# Patient Record
Sex: FEMALE | Race: WHITE | NOT HISPANIC OR LATINO | Employment: FULL TIME | URBAN - METROPOLITAN AREA
[De-identification: names, ages, dates, MRNs, and addresses within clinical notes are randomized per-mention and may not be internally consistent; named-entity substitution may affect disease eponyms.]

---

## 2020-12-10 ENCOUNTER — COMMUNICATION - HEALTHEAST (OUTPATIENT)
Dept: SCHEDULING | Facility: CLINIC | Age: 35
End: 2020-12-10

## 2021-05-20 ENCOUNTER — COMMUNICATION - HEALTHEAST (OUTPATIENT)
Dept: SCHEDULING | Facility: CLINIC | Age: 36
End: 2021-05-20

## 2021-05-30 ENCOUNTER — RECORDS - HEALTHEAST (OUTPATIENT)
Dept: ADMINISTRATIVE | Facility: CLINIC | Age: 36
End: 2021-05-30

## 2021-05-31 ENCOUNTER — RECORDS - HEALTHEAST (OUTPATIENT)
Dept: ADMINISTRATIVE | Facility: CLINIC | Age: 36
End: 2021-05-31

## 2021-06-02 ENCOUNTER — RECORDS - HEALTHEAST (OUTPATIENT)
Dept: ADMINISTRATIVE | Facility: CLINIC | Age: 36
End: 2021-06-02

## 2021-06-13 NOTE — TELEPHONE ENCOUNTER
She was given a cortisone shot by the podiatrist and broke out in hives on her arms and legs. She normally takes Zyrtec so will continue to do that. I connected her with scheduling for an appointment and advised urgent care if she's unable to get an appointment after she's done with work today.  Melanie Lozada RN  [facility]      Reason for Disposition    [1] MODERATE-SEVERE hives persist (i.e., hives interfere with normal activities or work) AND [2] taking antihistamine (e.g., Benadryl, Claritin) > 24 hours    Additional Information    Negative: [1] Life-threatening reaction (anaphylaxis) in the past to similar substance (e.g., food, insect bite/sting, chemical, etc.) AND [2] < 2 hours since exposure    Negative: Difficulty breathing or wheezing now    Negative: [1] Swollen tongue AND [2] rapid onset    Negative: [1] Hoarseness or cough now AND [2] rapid onset    Negative: Shock suspected (e.g., cold/pale/clammy skin, too weak to stand, low BP, rapid pulse)    Negative: Difficult to awaken or acting confused (e.g., disoriented, slurred speech)    Negative: Sounds like a life-threatening emergency to the triager    Negative: [1] Bee, wasp, or yellow jacket sting AND [2] within last 24 hours    Negative: Blood-colored, dark red or purple rash    Negative: [1] Drug rash suspected AND [2] started taking new medicine within last 2 weeks(Exception: antihistamine, eye drops, ear drops, decongestant or other OTC cough/cold medicines)    Negative: Doesn't match the SYMPTOMS of hives    Negative: Swollen tongue    Negative: [1] Widespread hives AND [2] onset < 2 hours of exposure to high-risk allergen (e.g., peanuts, tree nuts, fish or shellfish)    Negative: Patient sounds very sick or weak to the triager    Protocols used: HIVES-A-

## 2021-06-16 PROBLEM — F10.10 ALCOHOL ABUSE: Status: ACTIVE | Noted: 2018-08-31

## 2021-06-16 PROBLEM — M54.50 ACUTE MIDLINE LOW BACK PAIN WITHOUT SCIATICA: Status: ACTIVE | Noted: 2019-02-10

## 2021-06-16 PROBLEM — F33.1 MODERATE EPISODE OF RECURRENT MAJOR DEPRESSIVE DISORDER (H): Status: ACTIVE | Noted: 2019-03-04

## 2021-06-17 NOTE — TELEPHONE ENCOUNTER
[facility] patient    At about 1030-11 took her anti-depressant    Got stuck in her throat   Has tried multiple things to get it to go down     Heart rate is up high and back is tight    A little bit of difficulty breathing    Top of body is very heavy    Hurts to breath in  -starts in back     Pain is severe    Triaged to a disposition of Call 911 EMS. Patient is agreeable.     COVID 19 Nurse Triage Plan/Patient Instructions    Please be aware that novel coronavirus (COVID-19) may be circulating in the community. If you develop symptoms such as fever, cough, or SOB or if you have concerns about the presence of another infection including coronavirus (COVID-19), please contact your health care provider or visit  [email or web address].    Disposition/Instructions    Call to EMS/911 recommended. Follow protocol based instructions.    Bring Your Own Device:  Please also bring your smart device(s) (smart phones, tablets, laptops) and their charging cables for your personal use and to communicate with your care team during your visit.      Thank you for taking steps to prevent the spread of this virus.  o Limit your contact with others.  o Wear a simple mask to cover your cough.  o Wash your hands well and often.    Resources    [facility]: About COVID-19: [email or web address]    CDC: What to Do If You're Sick: www.cdc.gov/coronavirus/2019-ncov/about/steps-when-sick.html    CDC: Ending Home Isolation: www.cdc.gov/coronavirus/2019-ncov/hcp/disposition-in-home-patients.html     CDC: Caring for Someone: www.cdc.gov/coronavirus/2019-ncov/if-you-are-sick/care-for-someone.html     [facility]: Interim Guidance for Hospital Discharge to Home: www.health.state.[place]/diseases/coronavirus/hcp/hospdischarge.pdf    [facility] clinical trials (COVID-19 research studies): [email or web address]     Below are the COVID-19 hotlines at the [place] Department of Health ([facility]).  Interpreters are available.   o For health questions: Call 038-944-4729 or 1-797.480.8065 (7 a.m. to 7 p.m.)  o For questions about schools and childcare: Call 735-165-4416 or 1-709.898.1247 (7 a.m. to 7 p.m.)     Reason for Disposition    SEVERE symptoms of pill stuck in throat or esophagus (e.g., severe pain, bleeding, or inability to swallow liquids)    Any difficulty breathing (e.g., coughing, wheezing or stridor)    Additional Information    Negative: Sounds like a life-threatening emergency to the triager    Negative: Symptoms of blocked esophagus (e.g., can't swallow normal secretions, drooling)    Negative: Symptoms of FB stuck in throat or esophagus (e.g., continued pain in throat or chest, FB sensation, blood-tinged saliva) (Exception: pill stuck)    Negative: Can't swallow water or bread    Negative: Sharp object  (e.g., needle, nail, safety pin, toothpick, bone, bottle cap, pull tab, dental bridge work)     (Exception: tiny chips of glass generally pass without any symptoms)    Negative: Button battery (or other battery) known or suspected    Negative: Magnet    Negative: Packet of drugs (e.g., condom filled with cocaine)    Negative: Swallowed 2 or more FBs (e.g., multiple objects)    Negative: Swallowed a poisonous object (e.g., a lead sinker or a bullet)    Negative: Swallowed a (non-food) FB on purpose    Negative: Choked on or inhaled food or foreign body (but did not swallow it)    Protocols used: SWALLOWED FOREIGN BODY-A-BAUDILIO Lozano RN Triage Nurse Advisor

## 2021-08-25 ENCOUNTER — HOSPITAL ENCOUNTER (EMERGENCY)
Facility: HOSPITAL | Age: 36
Discharge: HOME | End: 2021-08-25
Attending: EMERGENCY MEDICINE | Admitting: EMERGENCY MEDICINE
Payer: COMMERCIAL

## 2021-08-25 VITALS
WEIGHT: 198 LBS | RESPIRATION RATE: 16 BRPM | HEART RATE: 93 BPM | TEMPERATURE: 97.8 F | OXYGEN SATURATION: 100 % | SYSTOLIC BLOOD PRESSURE: 124 MMHG | BODY MASS INDEX: 36.21 KG/M2 | DIASTOLIC BLOOD PRESSURE: 79 MMHG

## 2021-08-25 DIAGNOSIS — M54.50 ACUTE BILATERAL LOW BACK PAIN WITHOUT SCIATICA: ICD-10-CM

## 2021-08-25 PROCEDURE — 250N000013 HC RX MED GY IP 250 OP 250 PS 637: Performed by: EMERGENCY MEDICINE

## 2021-08-25 PROCEDURE — 99283 EMERGENCY DEPT VISIT LOW MDM: CPT

## 2021-08-25 RX ORDER — METHOCARBAMOL 750 MG/1
750-1500 TABLET, FILM COATED ORAL 4 TIMES DAILY PRN
Qty: 30 TABLET | Refills: 0 | Status: SHIPPED | OUTPATIENT
Start: 2021-08-25

## 2021-08-25 RX ORDER — HYDROCODONE BITARTRATE AND ACETAMINOPHEN 5; 325 MG/1; MG/1
1-2 TABLET ORAL EVERY 4 HOURS PRN
Qty: 18 TABLET | Refills: 0 | Status: SHIPPED | OUTPATIENT
Start: 2021-08-25 | End: 2021-08-28

## 2021-08-25 RX ORDER — METHOCARBAMOL 500 MG/1
1500 TABLET, FILM COATED ORAL ONCE
Status: COMPLETED | OUTPATIENT
Start: 2021-08-25 | End: 2021-08-25

## 2021-08-25 RX ORDER — HYDROCODONE BITARTRATE AND ACETAMINOPHEN 5; 325 MG/1; MG/1
2 TABLET ORAL ONCE
Status: COMPLETED | OUTPATIENT
Start: 2021-08-25 | End: 2021-08-25

## 2021-08-25 RX ADMIN — HYDROCODONE BITARTRATE AND ACETAMINOPHEN 2 TABLET: 5; 325 TABLET ORAL at 23:44

## 2021-08-25 RX ADMIN — METHOCARBAMOL 1500 MG: 500 TABLET, FILM COATED ORAL at 23:43

## 2021-08-25 NOTE — ED TRIAGE NOTES
Pt has been having mid to low back pain for 3 days without known cause; taking ibuprofen without relief.

## 2021-08-25 NOTE — Clinical Note
Katelynn Cronin was seen and treated in our emergency department on 8/25/2021.  She may return to work on 08/30/2021.  No work until August 30.  May return sooner if feeling well     If you have any questions or concerns, please don't hesitate to call.      Yessy Woo MD

## 2021-08-26 NOTE — DISCHARGE INSTRUCTIONS
Ice to your back for 15 minutes 1 PM for 1 to 2 hours  IbuProfen 600 mg every 6 hours as needed for pain  followup with your primary care doctor within the next week for recheck  Return to the emergency department for worsening problems or concerns

## 2021-08-26 NOTE — ED PROVIDER NOTES
EMERGENCY DEPARTMENT ENCOUnter      NAME: Katelynn Cronin  AGE: 36 year old female  YOB: 1985  MRN: [id number]  EVALUATION DATE & TIME: 8/25/2021 10:15 PM    PCP: Ayden Grace    ED PROVIDER: Yessy Woo MD      Chief Complaint   Patient presents with     Back Pain         FINAL IMPRESSION:  1. Acute bilateral low back pain without sciatica          ED COURSE & MEDICAL DECISION MAKING:    In summary, the patient is a 36-year-old female that presents to the emergency department for low back pain thought secondary to a musculoskeletal etiology.  She has no concerning symptoms for her back pain.  She has no urinary symptoms concerning for urinary tract infection.  2315 Introduced myself to patient, gathered patient history, and performed an initial exam. Patient reports that she will not be driving herself tonight.  Vicodin 2 tablets p.o. was administered for pain.  Robaxin 1500 mg p.o. was administered for muscle relaxation.  2320 I discussed the plan for discharge with the patient, and patient is agreeable. We discussed supportive cares at home and reasons for return to the ER including new or worsening symptoms - all questions and concerns addressed. Patient to be discharged by RN.          At the conclusion of the encounter I discussed the results of all of the tests and the disposition. The questions were answered. The patient or family acknowledged understanding and was agreeable with the care plan.         MEDICATIONS GIVEN IN THE EMERGENCY:  Medications   HYDROcodone-acetaminophen (NORCO) 5-325 MG per tablet 2 tablet (2 tablets Oral Given 8/25/21 2344)   methocarbamol (ROBAXIN) tablet 1,500 mg (1,500 mg Oral Given 8/25/21 2343)       NEW PRESCRIPTIONS STARTED AT TODAY'S ER VISIT  Discharge Medication List as of 8/25/2021 11:45 PM      START taking these medications    Details   HYDROcodone-acetaminophen (NORCO) 5-325 MG tablet Take 1-2 tablets by mouth every 4 hours as needed for  pain, Disp-18 tablet, R-0, Local Print      methocarbamol (ROBAXIN) 750 MG tablet Take 1-2 tablets (750-1,500 mg) by mouth 4 times daily as needed for muscle spasms, Disp-30 tablet, R-0, Local Print                =================================================================    HPI        Katelynn Cronin is a 36 year old female with a pertinent history of MACARIO III, PPH, hypercholesteremia, depression, and alcohol abuse who presents to this ED by walk in for evaluation of back pain. Patient presents with constant lower back pain that began ~3 days ago that suddenly worsened when she took a shower this evening. Currently, back pain is sharp and radiates upwards. Pain is 10/10 in intensity and worsens with walking. Denies any palliating factors. She has tried ibuprofen without relief for pain. Denies injuries or trauma to the area, medical history, or allergies to medications. Patient takes daily medications for her anxiety and depression. Patient denies urinary symptoms, fever, chills, nausea, vomiting, diarrhea, cough, or any additional complaints at this time.     SHx: Patient is a non-smoker and occasional drinker. Patient works at [employer].       REVIEW OF SYSTEMS     Constitutional:  Denies fever or chills  HENT:  Denies sore throat   Respiratory:  Denies cough or shortness of breath   Cardiovascular:  Denies chest pain or palpitations  GI:  Denies abdominal pain, nausea, or vomiting  Musculoskeletal:  Denies any new extremity pain. Endorses back pain.   Skin:  Denies rash   Neurologic:  Denies headache, focal weakness or sensory changes    All other systems reviewed and are negative      PAST MEDICAL HISTORY:  Past Medical History:   Diagnosis Date     Acute midline low back pain without sciatica 2/10/2019    Formatting of this note might be different from the original. Slipped and fell directly onto her back on 2/4/2019. Was seen at [facility] ER on 2/4/2019 and lumbar spine x-rays showed an age  indeterminate 20% compression of L2 (but patient was not found to have tenderness to palpation over L2 on clinical exam in the ER), and lumbar spine, pelvis and left hip x-rays were otherwise unremarkable     Alcohol abuse 8/31/2018    Formatting of this note might be different from the original. [facility] ER visit on 6/29/2018 for panic attack with alcohol intoxication (blood alcohol level 0.125).  Hospitalization at [facility] 8/13-8/20/2018 for alcohol intoxication (MILTON 0.16) and acetaminophen overdose. Patient was voluntarily admitted to the inpatient psychiatric unit for observation and treatment following medi     Allergic rhinitis 5/7/2014    Formatting of this note might be different from the original. Her symptoms are worst during the Spring (?) Skin Prick Testing was performed on: 6/13/2014  Sensitive to: English Plantain & Molds     Amblyopia 7/8/2010    Formatting of this note might be different from the original. Right eye.     Anxiety 2/29/2012    Formatting of this note might be different from the original. Zoloft started 2/29/12.  Switched to Effexor XR 1/15/2014.     MACARIO III (cervical intraepithelial neoplasia grade III) with severe dysplasia 10/29/2013    Formatting of this note might be different from the original. 09/04/2012 [facility]USHPV+ 10/02/2012 [facility] MACARIO I 09/10/2013 [facility]/ASCUS/HPV+ 10/10/2013 [facility] MACARIO II 10/29/2013 [facility] MACARIO II-III, Negative margins 01/29/2015 NIL/HPV negative 03/06/2015 NIL/HPV negative 05/17/2016 NIL/HPV negative 02/28/2019 NIL/HPV negative  ASCCP recommends:  History of CIN2 - CIN3:  Pap and HPV every 3 years for 20 years.   Pl     Dysthymic disorder 1/25/2013     Eczema 2/19/2015     Hypercholesterolemia 2/19/2015    Formatting of this note might be different from the original. Mildly elevated total and LDL cholesterol levels, along with a low HDL level, noted 2/19/2015.     Lateral epicondylitis of right elbow 8/28/2014     Moderate episode of recurrent major  depressive disorder (H) 3/4/2019    Formatting of this note might be different from the original. Zoloft started 2/29/12. Previously was on medication from 7254-9799, and briefly again in 2009 for postpartum depression. Effexor XR started on 1/15/2014. She is also following with Psychology for counseling therapy.     Overweight 6/29/2009     PPH (postpartum hemorrhage) 3/13/2009     Vitamin D deficiency 6/13/2014    Formatting of this note might be different from the original. Treatment started 6/13/2014 with Vitamin D 5,000 units daily per Allergy specialist.     Vocal cord dysfunction 6/15/2014    Formatting of this note might be different from the original. Diagnosed by Allergy specialist 6/13/2014, and treatment with Speech Therapy offered if the patient wishes to pursue this.       PAST SURGICAL HISTORY:  History reviewed. No pertinent surgical history.        CURRENT MEDICATIONS:    HYDROcodone-acetaminophen (NORCO) 5-325 MG tablet  methocarbamol (ROBAXIN) 750 MG tablet  cetirizine (ZYRTEC) 10 MG tablet  gabapentin (NEURONTIN) 100 MG capsule  naproxen (NAPROSYN) 500 MG tablet  QUEtiapine (SEROQUEL) 50 MG tablet  selenium sulfide 2.25 % Sham  venlafaxine (EFFEXOR-XR) 150 MG 24 hr capsule        ALLERGIES:  Allergies   Allergen Reactions     Grass Extracts [Gramineae Pollens] Rash and Swelling     Swelling, itching, and no voice   Swelling, itching, and no voice        Pollen Extracts [Pollen Extract] Rash     Swelling, itching, and no voice        FAMILY HISTORY:  History reviewed. No pertinent family history.    SOCIAL HISTORY:   Social History     Socioeconomic History     Marital status: Single     Spouse name: None     Number of children: None     Years of education: None     Highest education level: None   Occupational History     None   Tobacco Use     Smoking status: None   Substance and Sexual Activity     Alcohol use: None     Drug use: None     Sexual activity: None   Other Topics Concern     None    Social History Narrative     None     Social Determinants of Health     Financial Resource Strain:      Difficulty of Paying Living Expenses:    Food Insecurity:      Worried About Running Out of Food in the Last Year:      Ran Out of Food in the Last Year:    Transportation Needs:      Lack of Transportation (Medical):      Lack of Transportation (Non-Medical):    Physical Activity:      Days of Exercise per Week:      Minutes of Exercise per Session:    Stress:      Feeling of Stress :    Social Connections:      Frequency of Communication with Friends and Family:      Frequency of Social Gatherings with Friends and Family:      Attends Congregation Services:      Active Member of Clubs or Organizations:      Attends Club or Organization Meetings:      Marital Status:    Intimate Partner Violence:      Fear of Current or Ex-Partner:      Emotionally Abused:      Physically Abused:      Sexually Abused:        VITALS:  Patient Vitals for the past 24 hrs:   BP Temp Temp src Pulse Resp SpO2 Weight   08/25/21 1835 -- 97.8  F (36.6  C) Oral -- -- -- --   08/25/21 1833 124/79 -- -- 93 16 100 % 89.8 kg (198 lb)       PHYSICAL EXAM    Constitutional:  Well developed, Well nourished,  HENT:  Normocephalic, Atraumatic, Bilateral external ears normal, Oropharynx moist, Nose normal.   Neck:  Normal range of motion, No meningismus, No stridor.   Eyes:  EOMI, Conjunctiva normal, No discharge.   Respiratory:  Normal breath sounds, No respiratory distress, No wheezing, No chest tenderness.   Cardiovascular:  Normal heart rate, Normal rhythm, No murmurs  GI:  Soft, No tenderness, No guarding, No CVA tenderness.   Musculoskeletal:  Neurovascularly intact distally, No edema,  Tenderness left lumbar paraspinal region, No cyanosis, Good range of motion in all major joints.  tenderness to palpation left lumbar paraspinal region; spasm appreciated left lumbar paraspinal region  Integument:  Warm, Dry, No erythema, No rash.   Lymphatic:   No lymphadenopathy noted.   Neurologic:  Alert & oriented x 3, Normal motor function, Normal sensory function, No focal deficits noted.   Psychiatric:  Affect normal, Judgment normal, Mood normal.          I, Frankie Waddell, am serving as a scribe to document services personally performed by Dr. Woo based on my observation and the provider's statements to me. I, Yessy Woo MD attest that Frankie Waddell is acting in a scribe capacity, has observed my performance of the services and has documented them in accordance with my direction.    Yessy Woo MD  Emergency Medicine  [facility] EMERGENCY DEPARTMENT  [facility] [facility] [address] [phone]  Dept: [phone]     Yessy Woo MD  08/27/21 8590

## 2021-09-30 ENCOUNTER — HOSPITAL ENCOUNTER (EMERGENCY)
Facility: HOSPITAL | Age: 36
Discharge: HOME | End: 2021-10-01
Attending: STUDENT IN AN ORGANIZED HEALTH CARE EDUCATION/TRAINING PROGRAM | Admitting: STUDENT IN AN ORGANIZED HEALTH CARE EDUCATION/TRAINING PROGRAM
Payer: COMMERCIAL

## 2021-09-30 DIAGNOSIS — R53.83 OTHER FATIGUE: ICD-10-CM

## 2021-09-30 LAB
ANION GAP SERPL CALCULATED.3IONS-SCNC: 9 MMOL/L (ref 5–18)
BASOPHILS # BLD AUTO: 0 10E3/UL (ref 0–0.2)
BASOPHILS NFR BLD AUTO: 1 %
BUN SERPL-MCNC: 13 MG/DL (ref 8–22)
CALCIUM SERPL-MCNC: 8.9 MG/DL (ref 8.5–10.5)
CHLORIDE BLD-SCNC: 107 MMOL/L (ref 98–107)
CO2 SERPL-SCNC: 27 MMOL/L (ref 22–31)
CREAT SERPL-MCNC: 0.88 MG/DL (ref 0.6–1.1)
EOSINOPHIL # BLD AUTO: 0.2 10E3/UL (ref 0–0.7)
EOSINOPHIL NFR BLD AUTO: 3 %
ERYTHROCYTE [DISTWIDTH] IN BLOOD BY AUTOMATED COUNT: 12.8 % (ref 10–15)
GFR SERPL CREATININE-BSD FRML MDRD: 84 ML/MIN/1.73M2
GLUCOSE BLD-MCNC: 97 MG/DL (ref 70–125)
HCT VFR BLD AUTO: 43.1 % (ref 35–47)
HGB BLD-MCNC: 14.3 G/DL (ref 11.7–15.7)
IMM GRANULOCYTES # BLD: 0 10E3/UL
IMM GRANULOCYTES NFR BLD: 0 %
LYMPHOCYTES # BLD AUTO: 2.5 10E3/UL (ref 0.8–5.3)
LYMPHOCYTES NFR BLD AUTO: 32 %
MCH RBC QN AUTO: 31.5 PG (ref 26.5–33)
MCHC RBC AUTO-ENTMCNC: 33.2 G/DL (ref 31.5–36.5)
MCV RBC AUTO: 95 FL (ref 78–100)
MONOCYTES # BLD AUTO: 0.6 10E3/UL (ref 0–1.3)
MONOCYTES NFR BLD AUTO: 8 %
NEUTROPHILS # BLD AUTO: 4.6 10E3/UL (ref 1.6–8.3)
NEUTROPHILS NFR BLD AUTO: 56 %
NRBC # BLD AUTO: 0 10E3/UL
NRBC BLD AUTO-RTO: 0 /100
PLATELET # BLD AUTO: 372 10E3/UL (ref 150–450)
POTASSIUM BLD-SCNC: 3.7 MMOL/L (ref 3.5–5)
RBC # BLD AUTO: 4.54 10E6/UL (ref 3.8–5.2)
SODIUM SERPL-SCNC: 143 MMOL/L (ref 136–145)
WBC # BLD AUTO: 7.9 10E3/UL (ref 4–11)

## 2021-09-30 PROCEDURE — 81025 URINE PREGNANCY TEST: CPT | Performed by: STUDENT IN AN ORGANIZED HEALTH CARE EDUCATION/TRAINING PROGRAM

## 2021-09-30 PROCEDURE — 36415 COLL VENOUS BLD VENIPUNCTURE: CPT | Performed by: STUDENT IN AN ORGANIZED HEALTH CARE EDUCATION/TRAINING PROGRAM

## 2021-09-30 PROCEDURE — 99284 EMERGENCY DEPT VISIT MOD MDM: CPT

## 2021-09-30 PROCEDURE — 80048 BASIC METABOLIC PNL TOTAL CA: CPT | Performed by: STUDENT IN AN ORGANIZED HEALTH CARE EDUCATION/TRAINING PROGRAM

## 2021-09-30 PROCEDURE — 93005 ELECTROCARDIOGRAM TRACING: CPT | Performed by: STUDENT IN AN ORGANIZED HEALTH CARE EDUCATION/TRAINING PROGRAM

## 2021-09-30 PROCEDURE — 85025 COMPLETE CBC W/AUTO DIFF WBC: CPT | Performed by: STUDENT IN AN ORGANIZED HEALTH CARE EDUCATION/TRAINING PROGRAM

## 2021-09-30 ASSESSMENT — ENCOUNTER SYMPTOMS
DIZZINESS: 1
HEADACHES: 1
FATIGUE: 1
ANAL BLEEDING: 0
SORE THROAT: 1
FEVER: 0
SHORTNESS OF BREATH: 1

## 2021-10-01 VITALS
HEART RATE: 69 BPM | SYSTOLIC BLOOD PRESSURE: 110 MMHG | WEIGHT: 201 LBS | RESPIRATION RATE: 15 BRPM | DIASTOLIC BLOOD PRESSURE: 65 MMHG | OXYGEN SATURATION: 98 % | TEMPERATURE: 98.2 F | HEIGHT: 62 IN | BODY MASS INDEX: 36.99 KG/M2

## 2021-10-01 LAB
ALBUMIN UR-MCNC: NEGATIVE MG/DL
APPEARANCE UR: ABNORMAL
ATRIAL RATE - MUSE: 67 BPM
BACTERIA #/AREA URNS HPF: ABNORMAL /HPF
BILIRUB UR QL STRIP: NEGATIVE
COLOR UR AUTO: ABNORMAL
DIASTOLIC BLOOD PRESSURE - MUSE: NORMAL MMHG
GLUCOSE UR STRIP-MCNC: NEGATIVE MG/DL
HCG UR QL: NEGATIVE
HGB UR QL STRIP: ABNORMAL
INTERNAL QC OK POCT: NORMAL
INTERPRETATION ECG - MUSE: NORMAL
KETONES UR STRIP-MCNC: NEGATIVE MG/DL
LEUKOCYTE ESTERASE UR QL STRIP: NEGATIVE
MUCOUS THREADS #/AREA URNS LPF: PRESENT /LPF
NITRATE UR QL: NEGATIVE
P AXIS - MUSE: 29 DEGREES
PH UR STRIP: 6.5 [PH] (ref 5–7)
PR INTERVAL - MUSE: 152 MS
QRS DURATION - MUSE: 90 MS
QT - MUSE: 422 MS
QTC - MUSE: 445 MS
R AXIS - MUSE: -35 DEGREES
RBC URINE: 3 /HPF
SP GR UR STRIP: 1.02 (ref 1–1.03)
SQUAMOUS EPITHELIAL: 4 /HPF
SYSTOLIC BLOOD PRESSURE - MUSE: NORMAL MMHG
T AXIS - MUSE: 7 DEGREES
UROBILINOGEN UR STRIP-MCNC: <2 MG/DL
VENTRICULAR RATE- MUSE: 67 BPM
WBC URINE: <1 /HPF

## 2021-10-01 PROCEDURE — 81001 URINALYSIS AUTO W/SCOPE: CPT | Performed by: STUDENT IN AN ORGANIZED HEALTH CARE EDUCATION/TRAINING PROGRAM

## 2021-10-01 NOTE — ED NOTES
Patient reports she saw something dark colored on floor of convenience store. Is convinced this was black mold and that she has been exposed to toxic substance. Oxygen 100% on room air. Lungs clear all fields.

## 2021-10-01 NOTE — ED TRIAGE NOTES
"Patient reports concern that she was exposed to \"black mold\" today.  Reported she was gagging after leaving a gas station.  Soon after, she developed headache and fatigue.  Has not had anything for headache.  Denies any vision changes.   "

## 2021-10-01 NOTE — ED PROVIDER NOTES
EMERGENCY DEPARTMENT ENCOUNTER      NAME: Katelynn Cronin  AGE: 36 year old female  YOB: 1985  MRN: [id number]  EVALUATION DATE & TIME: 9/30/2021 10:29 PM    PCP: Lokesh Logan    ED PROVIDER: Ponce Jack M.D.      Chief Complaint   Patient presents with     Headache     Fatigue         FINAL IMPRESSION:  1. Other fatigue          ED COURSE & MEDICAL DECISION MAKING:    Pertinent Labs & Imaging studies reviewed. (See chart for details)  36 year old female presents to the Emergency Department for evaluation of fatigue and an episode of dizziness.  Patient here in the emergency department actually feels fine.  She was concerned the possibility of black mold however based on the symptomatology I think this is very unlikely.  Patient's work-up here in the emergency department is essentially normal.  No signs of pregnancy or infection.  EKG is normal.  Overall without an acute process identified we will discharge the patient home and have her follow-up with her primary doctor.    At the conclusion of the encounter I discussed the results of all of the tests and the disposition. The questions were answered. The patient or family acknowledged understanding and was agreeable with the care plan.           11:11 PM I met with the patient, obtained history, performed an initial exam, and discussed options and plan for diagnostics and treatment here in the ED.    MEDICATIONS GIVEN IN THE EMERGENCY:  Medications - No data to display    NEW PRESCRIPTIONS STARTED AT TODAY'S ER VISIT  Discharge Medication List as of 10/1/2021 12:59 AM             =================================================================    HPI    Patient information was obtained from: Patient    Use of : N/A     Katelynn Cronin is a 36 year old female with a pertinent history of MACARIO III, hypercholesterolemia, anxiety, and depression, who presents to this ED via car for evaluation of headache and fatigue.     Patient reports that  "this morning at a gas station, she looked down on the ground and saw what looked like \"black mold.\" She states that afterwards, she started gagging, noticed a metallic taste in her mouth, and felt fatigued. Patient notes she went home to sleep and pass symptoms but noticed that afterwards, she had difficulties getting out of bed. She states that she experienced some dizziness when she stood up. Patient reports she called triage nurse and was recommended to be seen in the ER. Patient currently notes a metallic taste in mouth, a dry throat, a slight headache, and states her \"body feels disoriented\" and she \"can't think straight.\"     Patient reports shortness of breath (resolved). Denies any urinary symptoms, fever, abdominal pain, and rashes. Patient denies any chance of pregnancy. No other complaints at this time.     REVIEW OF SYSTEMS   Review of Systems   Constitutional: Positive for fatigue. Negative for fever.        Positive for \"body disorientation.\"   HENT: Positive for sore throat (\"dry\").         Positive for \"metallic taste\" in mouth.   Respiratory: Positive for shortness of breath (resolved).    Gastrointestinal: Negative for anal bleeding.   Genitourinary: Negative.    Skin: Negative for rash.   Neurological: Positive for dizziness and headaches.   All other systems reviewed and are negative.       PAST MEDICAL HISTORY:  Past Medical History:   Diagnosis Date     Acute midline low back pain without sciatica 2/10/2019    Formatting of this note might be different from the original. Slipped and fell directly onto her back on 2/4/2019. Was seen at [facility] ER on 2/4/2019 and lumbar spine x-rays showed an age indeterminate 20% compression of L2 (but patient was not found to have tenderness to palpation over L2 on clinical exam in the ER), and lumbar spine, pelvis and left hip x-rays were otherwise unremarkable     Alcohol abuse 8/31/2018    Formatting of this note might be different from the original. " [facility] ER visit on 6/29/2018 for panic attack with alcohol intoxication (blood alcohol level 0.125).  Hospitalization at [facility] 8/13-8/20/2018 for alcohol intoxication (MILTON 0.16) and acetaminophen overdose. Patient was voluntarily admitted to the inpatient psychiatric unit for observation and treatment following medi     Allergic rhinitis 5/7/2014    Formatting of this note might be different from the original. Her symptoms are worst during the Spring (?) Skin Prick Testing was performed on: 6/13/2014  Sensitive to: English Plantain & Molds     Amblyopia 7/8/2010    Formatting of this note might be different from the original. Right eye.     Anxiety 2/29/2012    Formatting of this note might be different from the original. Zoloft started 2/29/12.  Switched to Effexor XR 1/15/2014.     MACARIO III (cervical intraepithelial neoplasia grade III) with severe dysplasia 10/29/2013    Formatting of this note might be different from the original. 09/04/2012 ASCUSHPV+ 10/02/2012 [place] MACARIO I 09/10/2013 [facility]/ASCUS/HPV+ 10/10/2013 [place] MACARIO II 10/29/2013 [place] MACARIO II-III, Negative margins 01/29/2015 NIL/HPV negative 03/06/2015 NIL/HPV negative 05/17/2016 NIL/HPV negative 02/28/2019 NIL/HPV negative  ASCCP recommends:  History of CIN2 - CIN3:  Pap and HPV every 3 years for 20 years.   Pl     Dysthymic disorder 1/25/2013     Eczema 2/19/2015     Hypercholesterolemia 2/19/2015    Formatting of this note might be different from the original. Mildly elevated total and LDL cholesterol levels, along with a low HDL level, noted 2/19/2015.     Lateral epicondylitis of right elbow 8/28/2014     Moderate episode of recurrent major depressive disorder (H) 3/4/2019    Formatting of this note might be different from the original. Zoloft started 2/29/12. Previously was on medication from [id number], and briefly again in 2009 for postpartum depression. Effexor XR started on 1/15/2014. She is also following with Psychology for  counseling therapy.     Overweight 6/29/2009     PPH (postpartum hemorrhage) 3/13/2009     Vitamin D deficiency 6/13/2014    Formatting of this note might be different from the original. Treatment started 6/13/2014 with Vitamin D 5,000 units daily per Allergy specialist.     Vocal cord dysfunction 6/15/2014    Formatting of this note might be different from the original. Diagnosed by Allergy specialist 6/13/2014, and treatment with Speech Therapy offered if the patient wishes to pursue this.       PAST SURGICAL HISTORY:  History reviewed. No pertinent surgical history.        CURRENT MEDICATIONS:    cetirizine (ZYRTEC) 10 MG tablet  gabapentin (NEURONTIN) 100 MG capsule  methocarbamol (ROBAXIN) 750 MG tablet  naproxen (NAPROSYN) 500 MG tablet  QUEtiapine (SEROQUEL) 50 MG tablet  selenium sulfide 2.25 % Sham  venlafaxine (EFFEXOR-XR) 150 MG 24 hr capsule        ALLERGIES:  Allergies   Allergen Reactions     Grass Extracts [Gramineae Pollens] Rash and Swelling     Swelling, itching, and no voice   Swelling, itching, and no voice        Pollen Extracts [Pollen Extract] Rash     Swelling, itching, and no voice        FAMILY HISTORY:  History reviewed. No pertinent family history.    SOCIAL HISTORY:   Social History     Socioeconomic History     Marital status: Single     Spouse name: Not on file     Number of children: Not on file     Years of education: Not on file     Highest education level: Not on file   Occupational History     Not on file   Tobacco Use     Smoking status: Not on file   Substance and Sexual Activity     Alcohol use: Not on file     Drug use: Not on file     Sexual activity: Not on file   Other Topics Concern     Not on file   Social History Narrative     Not on file     Social Determinants of Health     Financial Resource Strain:      Difficulty of Paying Living Expenses:    Food Insecurity:      Worried About Running Out of Food in the Last Year:      Ran Out of Food in the Last Year:   "  Transportation Needs:      Lack of Transportation (Medical):      Lack of Transportation (Non-Medical):    Physical Activity:      Days of Exercise per Week:      Minutes of Exercise per Session:    Stress:      Feeling of Stress :    Social Connections:      Frequency of Communication with Friends and Family:      Frequency of Social Gatherings with Friends and Family:      Attends Taoist Services:      Active Member of Clubs or Organizations:      Attends Club or Organization Meetings:      Marital Status:    Intimate Partner Violence:      Fear of Current or Ex-Partner:      Emotionally Abused:      Physically Abused:      Sexually Abused:        VITALS:  /65   Pulse 69   Temp 98.2  F (36.8  C) (Oral)   Resp 15   Ht 1.575 m (5' 2\")   Wt 91.2 kg (201 lb)   SpO2 98%   BMI 36.76 kg/m        PHYSICAL EXAM    Constitutional: Well developed, Well nourished, NAD, GCS 15  HENT: Normocephalic, Atraumatic, Bilateral external ears normal, Oropharynx normal, mucous membranes moist, Nose normal. Neck-  Normal range of motion, No tenderness, Supple, No stridor.  Eyes: PERRL, EOMI, Conjunctiva normal, No discharge.   Respiratory: Normal breath sounds, No respiratory distress, No wheezing, Speaks full sentences easily. No cough.  Cardiovascular: Normal heart rate, Regular rhythm, No murmurs, No rubs, No gallops. Chest wall nontender.  GI:Soft, No tenderness, No masses, No flank tenderness. No rebound or guarding.   Musculoskeletal: 2+ DP pulses. No edema.No cyanosis, No clubbing. Good range of motion in all major joints. No tenderness to palpation or major deformities noted.   Integument: Warm, Dry, No erythema, No rash. No petechiae.   Neurologic: Alert & oriented x 3,  CN 3-12 intact Normal motor function, Normal sensory function, No focal deficits noted. Normal gait. Normal finger to nose bilaterally  Psychiatric: Affect normal, Judgment normal, Mood normal. Cooperative.       LAB:  All pertinent labs " reviewed and interpreted.  Labs Ordered and Resulted from Time of ED Arrival Up to the Time of Departure from the ED   ROUTINE UA WITH MICROSCOPIC REFLEX TO CULTURE - Abnormal; Notable for the following components:       Result Value    Appearance Urine Turbid (*)     Blood Urine 0.03 mg/dL (*)     Bacteria Urine Few (*)     Mucus Urine Present (*)     RBC Urine 3 (*)     Squamous Epithelials Urine 4 (*)     All other components within normal limits    Narrative:     Urine Culture not indicated   BASIC METABOLIC PANEL - Normal   HCG QUALITATIVE URINE POCT - Normal   CBC WITH PLATELETS AND DIFFERENTIAL   CBC WITH PLATELETS & DIFFERENTIAL    Narrative:     The following orders were created for panel order CBC with Platelets & Differential.  Procedure                               Abnormality         Status                     ---------                               -----------         ------                     CBC with platelets and d...[[id number]]                      Final result                 Please view results for these tests on the individual orders.       RADIOLOGY:  Reviewed all pertinent imaging. Please see official radiology report.  No orders to display       EKG:    Performed at: [facility] Emergency Room. 30-Sep-2021, 00:04:02.     Impression: Normal sinus rhythm with sinus arrhythmia. Left axis deviation.    Rate: 67 BPM  Rhythm: Normal sinus rhythm with sinus arrhythmia.  Axis: (29) - (-35) - (7)  IN Interval: 152 ms  QRS Interval: 90 ms  QTc Interval: 445 ms  ST Changes: None  Comparison: Compared to ECG of 12-Jul-2011, 20:40:03. No significant changes.    I have independently reviewed and interpreted the EKG(s) documented above.      I, Winnie Gupta, am serving as a scribe to document services personally performed by Dr. Ponce Jack based on my observation and the provider's statements to me. I, Ponce Jack MD attest that Winnie Gupta is acting in a scribe capacity, has observed  my performance of the services and has documented them in accordance with my direction.    Ponce Jack M.D.  Emergency Medicine  [facility] EMERGENCY DEPARTMENT  [facility] [facility] [id number] [phone]  Dept: [phone]     Ponce Jack MD  10/06/21 0002

## 2021-10-01 NOTE — ED NOTES
This writer was advised that patient was having difficulties breathing.  Assessed patient and it appeared that she was holding her breath.  RA sats consistently at 95-96%.  States her headache has resolved but patient c/o difficulties speaking.  Dr. Tirado was made aware.  Vitals rechecked.  Patient's acuity is unchanged.

## 2022-03-29 ENCOUNTER — APPOINTMENT (OUTPATIENT)
Dept: CT IMAGING | Facility: HOSPITAL | Age: 37
End: 2022-03-29
Attending: EMERGENCY MEDICINE
Payer: COMMERCIAL

## 2022-03-29 ENCOUNTER — HOSPITAL ENCOUNTER (EMERGENCY)
Facility: HOSPITAL | Age: 37
Discharge: HOME | End: 2022-03-29
Attending: EMERGENCY MEDICINE
Payer: COMMERCIAL

## 2022-03-29 ENCOUNTER — HOSPITAL ENCOUNTER (EMERGENCY)
Facility: HOSPITAL | Age: 37
Discharge: HOME | End: 2022-03-29
Attending: FAMILY MEDICINE
Payer: COMMERCIAL

## 2022-03-29 VITALS
DIASTOLIC BLOOD PRESSURE: 91 MMHG | RESPIRATION RATE: 22 BRPM | HEART RATE: 80 BPM | BODY MASS INDEX: 37.1 KG/M2 | SYSTOLIC BLOOD PRESSURE: 159 MMHG | WEIGHT: 202.82 LBS | OXYGEN SATURATION: 100 % | TEMPERATURE: 97.5 F

## 2022-03-29 VITALS
HEIGHT: 62 IN | SYSTOLIC BLOOD PRESSURE: 115 MMHG | DIASTOLIC BLOOD PRESSURE: 77 MMHG | TEMPERATURE: 98.1 F | OXYGEN SATURATION: 98 % | RESPIRATION RATE: 18 BRPM | HEART RATE: 89 BPM | BODY MASS INDEX: 37.32 KG/M2 | WEIGHT: 202.82 LBS

## 2022-03-29 DIAGNOSIS — S03.00XA DISLOCATION OF TEMPOROMANDIBULAR JOINT, INITIAL ENCOUNTER: ICD-10-CM

## 2022-03-29 PROCEDURE — 99285 EMERGENCY DEPT VISIT HI MDM: CPT | Mod: 25

## 2022-03-29 PROCEDURE — 70486 CT MAXILLOFACIAL W/O DYE: CPT

## 2022-03-29 PROCEDURE — 250N000011 HC RX IP 250 OP 636: Performed by: EMERGENCY MEDICINE

## 2022-03-29 PROCEDURE — 250N000013 HC RX MED GY IP 250 OP 250 PS 637: Performed by: EMERGENCY MEDICINE

## 2022-03-29 PROCEDURE — 21480 CLTX TMPRMAND DISLC 1ST/SBSQ: CPT | Mod: 50

## 2022-03-29 PROCEDURE — 70486 CT MAXILLOFACIAL W/O DYE: CPT | Mod: 77

## 2022-03-29 PROCEDURE — 96374 THER/PROPH/DIAG INJ IV PUSH: CPT

## 2022-03-29 RX ORDER — PROPOFOL 10 MG/ML
100 INJECTION, EMULSION INTRAVENOUS ONCE
Status: COMPLETED | OUTPATIENT
Start: 2022-03-29 | End: 2022-03-29

## 2022-03-29 RX ORDER — DIAZEPAM 2 MG
2 TABLET ORAL ONCE
Status: COMPLETED | OUTPATIENT
Start: 2022-03-29 | End: 2022-03-29

## 2022-03-29 RX ORDER — DIAZEPAM 5 MG
5 TABLET ORAL EVERY 6 HOURS PRN
Qty: 5 TABLET | Refills: 0 | Status: SHIPPED | OUTPATIENT
Start: 2022-03-29

## 2022-03-29 RX ORDER — DIAZEPAM 5 MG
5 TABLET ORAL ONCE
Status: COMPLETED | OUTPATIENT
Start: 2022-03-29 | End: 2022-03-29

## 2022-03-29 RX ORDER — IBUPROFEN 600 MG/1
600 TABLET, FILM COATED ORAL ONCE
Status: COMPLETED | OUTPATIENT
Start: 2022-03-29 | End: 2022-03-29

## 2022-03-29 RX ORDER — OXYCODONE AND ACETAMINOPHEN 5; 325 MG/1; MG/1
1 TABLET ORAL ONCE
Status: COMPLETED | OUTPATIENT
Start: 2022-03-29 | End: 2022-03-29

## 2022-03-29 RX ADMIN — OXYCODONE HYDROCHLORIDE AND ACETAMINOPHEN 1 TABLET: 5; 325 TABLET ORAL at 18:44

## 2022-03-29 RX ADMIN — PROPOFOL 100 MG: 10 INJECTION, EMULSION INTRAVENOUS at 20:26

## 2022-03-29 RX ADMIN — DIAZEPAM 5 MG: 5 TABLET ORAL at 03:39

## 2022-03-29 RX ADMIN — IBUPROFEN 600 MG: 600 TABLET ORAL at 03:39

## 2022-03-29 RX ADMIN — DIAZEPAM 2 MG: 2 TABLET ORAL at 18:44

## 2022-03-29 ASSESSMENT — ENCOUNTER SYMPTOMS
CHILLS: 0
SHORTNESS OF BREATH: 0
FEVER: 0

## 2022-03-29 NOTE — ED NOTES
Pt acknowledged understanding of discharge instructions. Pt's head wrapped with ACE wrap. MD rechecked pt. Pt given copy of written discharge instructions. Pt given written prescription for pain medication. Pt acknowledged understanding of restrictions of taking narcotic pain medication. Pt ambulated out of ED in no signs of distress.

## 2022-03-29 NOTE — ED TRIAGE NOTES
"Pt was trying to go to sleep when she started to cough and gag \"my jaw feels dislocated on both sides\". Pt has history of TMJ.  "

## 2022-03-29 NOTE — Clinical Note
Katelynn Cronin was seen and treated in our emergency department on 3/29/2022.  She may return to work on 04/04/2022.  Seen in the emergency department.     If you have any questions or concerns, please don't hesitate to call.      Millie Gaston MD

## 2022-03-29 NOTE — ED PROVIDER NOTES
EMERGENCY DEPARTMENT ENCOUNTER      NAME: Katelynn Cronin  AGE: 36 year old female  YOB: 1985  MRN: [id number]  EVALUATION DATE & TIME: 3/29/2022  6:24 PM    PCP: Lokesh Logan    ED PROVIDER: Kraig Young M.D.      Chief Complaint   Patient presents with     Jaw dislocation       FINAL IMPRESSION:  1. Dislocation of temporomandibular joint, initial encounter        ED COURSE & MEDICAL DECISION MAKIN:30 PM I met with the patient for the initial interview and physical examination. Discussed plan for treatment and workup in the ED.    9:20 PM I rechecked and updated the patient.   9:29 PM I discussed patient follow up with Dr. Ramos with [facility] oral surgery.    36 year old female presents to the Emergency Department for evaluation of jaw dislocation.  She was seen earlier in the morning hours today for the same.  She has a palpable at least right-sided TMJ dislocation on exam and significant trismus and swelling.  Unable to reduce the dislocation this evening with Valium.  Patient was ultimately consented for a propofol sedation and reduction which was successful.  Initially she still has a lot of swelling and trismus with decreased jaw range of motion so was sent for repeat CT which confirms good position of the joint at this time.  Given her recurrent dislocation within 24 hours I did discuss the case with OMFS at the [facility].  They are going to accommodate her in clinic tomorrow at 9 AM.  She will continue with the previous plan for Valium, ibuprofen, and pure liquid diet until follow-up.  She was discharged in good condition.    At the conclusion of the encounter I discussed the results of all of the tests and the disposition. The questions were answered. The patient or family acknowledged understanding and was agreeable with the care plan.       MEDICATIONS GIVEN IN THE EMERGENCY:  Medications   diazepam (VALIUM) tablet 2 mg (2 mg Oral Given 3/29/22 6280)  "  oxyCODONE-acetaminophen (PERCOCET) 5-325 MG per tablet 1 tablet (1 tablet Oral Given 3/29/22 1844)   propofol (DIPRIVAN) injection 10 mg/mL vial (100 mg Intravenous Given 3/29/22 2026)     NEW PRESCRIPTIONS STARTED AT TODAY'S ER VISIT  New Prescriptions    No medications on file     =================================================================    HPI    Patient information was obtained from: Patient    Use of : N/A         Katelynn Cronin is a 36 year old female with no pertinent recorded medical history who presents to this ED via walk-in for evaluation of jaw dislocation.     Per chart review,   3/29/22 patient visited [facility] ED for evaluation of jaw pain. CT reveals anterior subluxation bilateral TMJs with significant cortical erosion of the left mandibular condyle.  Patient here did receive oral Valium and ibuprofen.  She states her pain is very well controlled. Tolerated manual reduction well and right-sided was placed easily, left side took some further manipulation but was able to be reduced.  After patient's pain is significantly improved, states her mouth feels back to baseline. Her face was wrapped, she was given a prescription for Valium, follow-up with TMJ specialist.    The patient reports being in the ED last night due to jaw dislocation where they were able to set it back in place. Patient returns today with her jaw dislocated again, stating that it happened \"while grunting\" or clearing her throat shortly prior to her arrival to the ED. She notes that the right side of her jaw is more painful than the left side. She denies any other symptoms or complaints at this time.       REVIEW OF SYSTEMS   All systems reviewed and negative except as noted in HPI.      PAST MEDICAL HISTORY:  Past Medical History:   Diagnosis Date     Acute midline low back pain without sciatica 2/10/2019    Formatting of this note might be different from the original. Slipped and fell directly onto her back on " 2/4/2019. Was seen at [facility] ER on 2/4/2019 and lumbar spine x-rays showed an age indeterminate 20% compression of L2 (but patient was not found to have tenderness to palpation over L2 on clinical exam in the ER), and lumbar spine, pelvis and left hip x-rays were otherwise unremarkable     Alcohol abuse 8/31/2018    Formatting of this note might be different from the original. [facility] ER visit on 6/29/2018 for panic attack with alcohol intoxication (blood alcohol level 0.125).  Hospitalization at [facility] 8/13-8/20/2018 for alcohol intoxication (MILTON 0.16) and acetaminophen overdose. Patient was voluntarily admitted to the inpatient psychiatric unit for observation and treatment following medi     Allergic rhinitis 5/7/2014    Formatting of this note might be different from the original. Her symptoms are worst during the Spring (?) Skin Prick Testing was performed on: 6/13/2014  Sensitive to: English Plantain & Molds     Amblyopia 7/8/2010    Formatting of this note might be different from the original. Right eye.     Anxiety 2/29/2012    Formatting of this note might be different from the original. Zoloft started 2/29/12.  Switched to Effexor XR 1/15/2014.     MACAIRO III (cervical intraepithelial neoplasia grade III) with severe dysplasia 10/29/2013    Formatting of this note might be different from the original. 09/04/2012 ASCUSHPV+ 10/02/2012 [place] MACARIO I 09/10/2013 [facility]/ASCUS/HPV+ 10/10/2013 [place] MACARIO II 10/29/2013 [facility] MACARIO II-III, Negative margins 01/29/2015 NIL/HPV negative 03/06/2015 NIL/HPV negative 05/17/2016 NIL/HPV negative 02/28/2019 NIL/HPV negative  ASCCP recommends:  History of CIN2 - CIN3:  Pap and HPV every 3 years for 20 years.   Pl     Dysthymic disorder 1/25/2013     Eczema 2/19/2015     Hypercholesterolemia 2/19/2015    Formatting of this note might be different from the original. Mildly elevated total and LDL cholesterol levels, along with a low HDL level, noted 2/19/2015.      Lateral epicondylitis of right elbow 8/28/2014     Moderate episode of recurrent major depressive disorder (H) 3/4/2019    Formatting of this note might be different from the original. Zoloft started 2/29/12. Previously was on medication from 3283-7081, and briefly again in 2009 for postpartum depression. Effexor XR started on 1/15/2014. She is also following with Psychology for counseling therapy.     Overweight 6/29/2009     PPH (postpartum hemorrhage) 3/13/2009     Vitamin D deficiency 6/13/2014    Formatting of this note might be different from the original. Treatment started 6/13/2014 with Vitamin D 5,000 units daily per Allergy specialist.     Vocal cord dysfunction 6/15/2014    Formatting of this note might be different from the original. Diagnosed by Allergy specialist 6/13/2014, and treatment with Speech Therapy offered if the patient wishes to pursue this.     PAST SURGICAL HISTORY:  No past surgical history on file.    CURRENT MEDICATIONS:    No current facility-administered medications for this encounter.     Current Outpatient Medications   Medication     cetirizine (ZYRTEC) 10 MG tablet     diazepam (VALIUM) 5 MG tablet     gabapentin (NEURONTIN) 100 MG capsule     methocarbamol (ROBAXIN) 750 MG tablet     naproxen (NAPROSYN) 500 MG tablet     QUEtiapine (SEROQUEL) 50 MG tablet     selenium sulfide 2.25 % Sham     venlafaxine (EFFEXOR-XR) 150 MG 24 hr capsule     ALLERGIES:  Allergies   Allergen Reactions     Grass Extracts [Gramineae Pollens] Rash and Swelling     Swelling, itching, and no voice   Swelling, itching, and no voice        Pollen Extracts [Pollen Extract] Rash     Swelling, itching, and no voice        FAMILY HISTORY:  No family history on file.    SOCIAL HISTORY:   Social History     Socioeconomic History     Marital status: Single     Spouse name: Not on file     Number of children: Not on file     Years of education: Not on file     Highest education level: Not on file   Occupational  History     Not on file   Tobacco Use     Smoking status: Not on file     Smokeless tobacco: Not on file   Substance and Sexual Activity     Alcohol use: Not on file     Drug use: Not on file     Sexual activity: Not on file   Other Topics Concern     Not on file   Social History Narrative     Not on file     Social Determinants of Health     Financial Resource Strain: Not on file   Food Insecurity: Not on file   Transportation Needs: Not on file   Physical Activity: Not on file   Stress: Not on file   Social Connections: Not on file   Intimate Partner Violence: Not on file   Housing Stability: Not on file       VITALS:  /57   Pulse 79   Temp 97.5  F (36.4  C) (Oral)   Resp 24   Wt 92 kg (202 lb 13.2 oz)   SpO2 100%   BMI 37.10 kg/m      PHYSICAL EXAM    Constitutional: Well developed, Well nourished, NAD.  HENT: Trismus and mild asymmetric right-sided facial swelling.  There is palpable subluxation of the right TMJ joint.  No other intraoral lesions.  Eyes: EOMI, Conjunctiva normal.  Respiratory: Breathing comfortably on room air. Speaks full sentences easily. Lungs clear to ascultation.  Cardiovascular: Normal heart rate, Regular rhythm. No peripheral edema.  Abdomen: Soft  Musculoskeletal: Good range of motion in all major joints. No major deformities noted.  Integument: Warm, Dry.  Neurologic: Alert & awake, Normal motor function, Normal sensory function, No focal deficits noted.   Psychiatric: Cooperative. Affect appropriate.       RADIOLOGY:  Reviewed all pertinent imaging. Please see official radiology report.  CT Facial Bones without Contrast   Final Result   Addendum 1 of 1   Addendum:      Addendum for dictation error. Impression should read:      IMPRESSION:    1.  Bilateral temporomandibular joints are well positioned.   2.  Unchanged erosive changes of the left mandibular condyle, raising    concern for inflammatory arthritis.         Final   IMPRESSION:    1.  Bilateral temporomandibular  joints are not well positioned.   2.  Unchanged erosive changes of the left mandibular condyle, raising concern for inflammatory arthritis.             PROCEDURES:   [facility]    Jaw Dislocation    Date/Time: 3/29/2022 10:05 PM  Performed by: Kraig Young MD  Authorized by: Kraig Young MD     Risks, benefits and alternatives discussed.    ED EVALUATION:      Assessment Time: 3/29/2022 7:05 PM      I have performed an Emergency Department Evaluation including taking a history and physical examination, this evaluation will be documented in the electronic medical record for this ED encounter.     Indication: Jaw Dislocation    ASA Class: Class 1- healthy patient    Mallampati: Grade 4- soft palate obscured by base of tongue    NPO Status: appropriately NPO for procedure    UNIVERSAL PROTOCOL   Site Marked: NA  Prior Images Obtained and Reviewed:  Yes  Required items: Required blood products, implants, devices and special equipment available    Patient identity confirmed:  Verbally with patient and arm band  Patient was reevaluated immediately before administering moderate or deep sedation or anesthesia  Confirmation Checklist:  Patient's identity using two indicators  Time out: Immediately prior to the procedure a time out was called    Universal Protocol: the Joint Commission Universal Protocol was followed      PRE PROCEDURE DETAILS      Injury location:  Bilateral TMJ    Chronicity:  Recurrent    Range of motion: reduced      SEDATION  Patient Sedated: Yes    Sedation Type:  Deep  Vital signs: Vital signs monitored during sedation      PROCEDURE DETAILS      Manipulation performed: yes      Jaw reduction method:  Downward posterior pressure    Reduction successful: yes      X-ray confirmed reduction: yes      POST PROCEDURE DETAILS      Range of motion: improved        PROCEDURE    Patient Tolerance:  Patient tolerated the procedure well with no immediate complications  Length of  time physician/provider present for 1:1 monitoring during sedation: 15          I, Anitra Hatch, am serving as a scribe to document services personally performed by Dr. Kraig Young, based on my observation and the provider's statements to me. I, Kraig Young MD attest that Anitra Hatch is acting in a scribe capacity, has observed my performance of the services and has documented them in accordance with my direction.    Kraig Young M.D.  Emergency Medicine  [facility] EMERGENCY DEPARTMENT  [address] [place] [id number]  [phone]  Dept: [phone]      Kraig Young MD  03/29/22 [id number]

## 2022-03-29 NOTE — DISCHARGE INSTRUCTIONS
You may also take Tylenol and ibuprofen alternating for pain control.  Use ice in the area of your jaw.  Follow-up with your TMJ specialist as well as your primary care doctor.  Use Valium as needed for muscle spasming as this may happen in your bilateral jaw over the next few days if the pain is not controlled with Tylenol or ibuprofen.  Try to do the best you can in order to not open your mouth widely, there is increased risk with you could have an additional dislocation.  It is important that you follow-up especially with your TMJ provider as they may have additional tips or tricks to help you with this.  Return to the emergency department for any new or worsening symptoms.

## 2022-03-29 NOTE — ED PROVIDER NOTES
EMERGENCY DEPARTMENT ENCOUNTER      NAME: Katelynn Cronin  AGE: 36 year old female  YOB: 1985  MRN: [id number]  EVALUATION DATE & TIME: 3/29/2022  3:14 AM    PCP: Lokesh Logan    ED PROVIDER: Moni Gaston M.D.      Chief Complaint   Patient presents with     Jaw Pain         FINAL IMPRESSION:  1. Dislocation of temporomandibular joint, initial encounter        MEDICAL DECISION MAKING:    Pertinent Labs & Imaging studies reviewed. (See chart for details)  ED Course as of 03/29/22 0447   Tue Mar 29, 2022   0332 Afebrile.  Vital signs here with some tachycardia, otherwise within normal limits.  Patient coming into the emergency department today for evaluation of jaw pain.  Bilateral, was at home and coughed in ER and was having pain in her jaw.  Has a history of TMJ.  She states she feels like her jaw is slipping backwards.  She is able to open and close her mouth however has difficulty with fully closing her teeth together.  Is able to rock her jaw from side to side.  Has tenderness to palpation especially over her left-sided TMJ and does have some clicking and some possible partial dislocation on this side.  Her teeth do line up, she is wearing a dental guard at this point.Has never had dislocated jaw in the past.Physical exam for patient here with tenderness to palpation over left TMJ, clicking when opening and closing her mouth, possible partial dislocation.  She is able to line up her teeth but has difficulty with fully closing her mouth.  No tenderness in the soft tissues of her mouth with palpation.  Able to extend and flex her jaw forwards and backwards as well as side to side but does have some tenderness on the left-hand side with that.  No other trauma.We will get a job [employer].  Will give some Valium to see if this does help with the spasming or pain in the left TMJ.       CT scan here does reveal anterior subluxation bilateral TMJs with significant cortical erosion of the left mandibular  condyle.  Patient here did receive oral Valium and ibuprofen.  She states her pain is very well controlled.  Was amenable to let me attempt manual reduction without further sedation.  Tolerated procedure well and right-sided was placed easily, left side did take some further manipulation but was able to be reduced.  Afterwards patient states that the pain is significantly improved.  She is able to close her mouth normally.  Is able to speak.  States that her mouth feels back to baseline and what it normally feels like.  Secondary to this do not feel she requires any additional imaging at this time.  Her face was wrapped, she was given a prescription for Valium to go home with as well as some time off work and instruction to follow-up with her TMJ specialist.  She is in agreement with plan    Critical care: 0 minutes excluding separately billable procedures.  Includes bedside management, time reviewing test results, review of records, discussing the case with staff, documenting the medical record and time spent with family members (or surrogate decision makers) discussing specific treatment issues.          ED COURSE:  3:20 AM I met with the patient, obtained history, performed an initial exam, and discussed options and plan for diagnostics and treatment here in the ED. PPE worn: n95 mask, surgical mask, hair cap, eye protection, and gloves.   0420 AM reduced bilateral jaw dislocation    The importance of close follow up was discussed. We reviewed warning signs and symptoms, and I instructed Ms. Cronin to return to the emergency department immediately if she develops any new or worsening symptoms. I provided additional verbal discharge instructions. Ms. Cronin expressed understanding and agreement with this plan of care, her questions were answered, and she was discharged in stable condition.     MEDICATIONS GIVEN IN THE EMERGENCY:  Medications   diazepam (VALIUM) tablet 5 mg (5 mg Oral Given 3/29/22 4499)  "  ibuprofen (ADVIL/MOTRIN) tablet 600 mg (600 mg Oral Given 3/29/22 0339)       NEW PRESCRIPTIONS STARTED AT TODAY'S ER VISIT:  New Prescriptions    DIAZEPAM (VALIUM) 5 MG TABLET    Take 1 tablet (5 mg) by mouth every 6 hours as needed for muscle spasms          =================================================================    HPI    Patient information was obtained from: patient     Use of : N/A        Katelynn Cronin is a 36 year old female with a pertinent history of TMJ who presents jaw pain.    Patient reports that she was trying to go to sleep when she coughed and gag which she then developed sudden onset of jaw pain and feels like it is going \"backwards\" and was \"clicking\". No prior dislocation. She is able to open and close her jaw well. No fever, chills or cough. Otherwise no other medical complaints at this time.       REVIEW OF SYSTEMS   Review of Systems   Constitutional: Negative for chills and fever.   HENT:        Positive jaw pain   Respiratory: Negative for shortness of breath.    All other systems reviewed and are negative.        PAST MEDICAL HISTORY:  Past Medical History:   Diagnosis Date     Acute midline low back pain without sciatica 2/10/2019    Formatting of this note might be different from the original. Slipped and fell directly onto her back on 2/4/2019. Was seen at [facility] ER on 2/4/2019 and lumbar spine x-rays showed an age indeterminate 20% compression of L2 (but patient was not found to have tenderness to palpation over L2 on clinical exam in the ER), and lumbar spine, pelvis and left hip x-rays were otherwise unremarkable     Alcohol abuse 8/31/2018    Formatting of this note might be different from the original. [facility] ER visit on 6/29/2018 for panic attack with alcohol intoxication (blood alcohol level 0.125).  Hospitalization at [facility] 8/13-8/20/2018 for alcohol intoxication (BAC 0.16) and acetaminophen overdose. Patient was voluntarily " admitted to the inpatient psychiatric unit for observation and treatment following medi     Allergic rhinitis 5/7/2014    Formatting of this note might be different from the original. Her symptoms are worst during the Spring (?) Skin Prick Testing was performed on: 6/13/2014  Sensitive to: English Plantain & Molds     Amblyopia 7/8/2010    Formatting of this note might be different from the original. Right eye.     Anxiety 2/29/2012    Formatting of this note might be different from the original. Zoloft started 2/29/12.  Switched to Effexor XR 1/15/2014.     MACARIO III (cervical intraepithelial neoplasia grade III) with severe dysplasia 10/29/2013    Formatting of this note might be different from the original. 09/04/2012 ASCUSHPV+ 10/02/2012 [place] MACARIO I 09/10/2013 [facility]/ASCUS/HPV+ 10/10/2013 [place] MACARIO II 10/29/2013 [facility] MACARIO II-III, Negative margins 01/29/2015 NIL/HPV negative 03/06/2015 NIL/HPV negative 05/17/2016 NIL/HPV negative 02/28/2019 NIL/HPV negative  AS[facility]P recommends:  History of CIN2 - CIN3:  Pap and HPV every 3 years for 20 years.   Pl     Dysthymic disorder 1/25/2013     Eczema 2/19/2015     Hypercholesterolemia 2/19/2015    Formatting of this note might be different from the original. Mildly elevated total and LDL cholesterol levels, along with a low HDL level, noted 2/19/2015.     Lateral epicondylitis of right elbow 8/28/2014     Moderate episode of recurrent major depressive disorder (H) 3/4/2019    Formatting of this note might be different from the original. Zoloft started 2/29/12. Previously was on medication from 4155-3564, and briefly again in 2009 for postpartum depression. Effexor XR started on 1/15/2014. She is also following with Psychology for counseling therapy.     Overweight 6/29/2009     PPH (postpartum hemorrhage) 3/13/2009     Vitamin D deficiency 6/13/2014    Formatting of this note might be different from the original. Treatment started 6/13/2014 with Vitamin D 5,000 units daily per  Allergy specialist.     Vocal cord dysfunction 6/15/2014    Formatting of this note might be different from the original. Diagnosed by Allergy specialist 6/13/2014, and treatment with Speech Therapy offered if the patient wishes to pursue this.       PAST SURGICAL HISTORY:  History reviewed. No pertinent surgical history.    CURRENT MEDICATIONS:    No current facility-administered medications for this encounter.    Current Outpatient Medications:      diazepam (VALIUM) 5 MG tablet, Take 1 tablet (5 mg) by mouth every 6 hours as needed for muscle spasms, Disp: 5 tablet, Rfl: 0     cetirizine (ZYRTEC) 10 MG tablet, [CETIRIZINE (ZYRTEC) 10 MG TABLET] Take 10 mg by mouth., Disp: , Rfl:      gabapentin (NEURONTIN) 100 MG capsule, [GABAPENTIN (NEURONTIN) 100 MG CAPSULE] Take 100 mg by mouth., Disp: , Rfl:      methocarbamol (ROBAXIN) 750 MG tablet, Take 1-2 tablets (750-1,500 mg) by mouth 4 times daily as needed for muscle spasms, Disp: 30 tablet, Rfl: 0     naproxen (NAPROSYN) 500 MG tablet, [NAPROXEN (NAPROSYN) 500 MG TABLET] Take 500 mg by mouth., Disp: , Rfl:      QUEtiapine (SEROQUEL) 50 MG tablet, [QUETIAPINE (SEROQUEL) 50 MG TABLET] Take 50 mg by mouth., Disp: , Rfl:      selenium sulfide 2.25 % Sham, [SELENIUM SULFIDE 2.25 % SHAM] Apply 6 mL topically daily., Disp: 180 mL, Rfl: 0     venlafaxine (EFFEXOR-XR) 150 MG 24 hr capsule, [VENLAFAXINE (EFFEXOR-XR) 150 MG 24 HR CAPSULE] TAKE 2 CAPSULES BY MOUTH EVERY DAY WITH A MEAL, Disp: , Rfl:     ALLERGIES:  Allergies   Allergen Reactions     Grass Extracts [Gramineae Pollens] Rash and Swelling     Swelling, itching, and no voice   Swelling, itching, and no voice        Pollen Extracts [Pollen Extract] Rash     Swelling, itching, and no voice        FAMILY HISTORY:  History reviewed. No pertinent family history.    SOCIAL HISTORY:   Social History     Socioeconomic History     Marital status: Single     Spouse name: None     Number of children: None     Years of  "education: None     Highest education level: None   Occupational History     None   Tobacco Use     Smoking status: None     Smokeless tobacco: None   Substance and Sexual Activity     Alcohol use: None     Drug use: None     Sexual activity: None   Other Topics Concern     None   Social History Narrative     None     Social Determinants of Health     Financial Resource Strain: Not on file   Food Insecurity: Not on file   Transportation Needs: Not on file   Physical Activity: Not on file   Stress: Not on file   Social Connections: Not on file   Intimate Partner Violence: Not on file   Housing Stability: Not on file       PHYSICAL EXAM:    Vitals: /72   Pulse 90   Temp 98.1  F (36.7  C) (Temporal)   Resp 20   Ht 1.575 m (5' 2\")   Wt 92 kg (202 lb 13.2 oz)   SpO2 97%   BMI 37.10 kg/m     General:. Alert and interactive, comfortable appearing.  HENT: Oropharynx without erythema or exudates. MMM.  TMs clear bilaterally. Tenderness to palpation over left TMJ, clicking when opening and closing her mouth, possible partial dislocation.  She is able to line up her teeth but has difficulty with fully closing her mouth.  No tenderness in the soft tissues of her mouth with palpation.  Able to extend and flex her jaw forwards and backwards as well as side to side but does have some tenderness on the left-hand side with that.  Eyes: Pupils mid-sized and equally reactive.   Neck: Full AROM.  No midline tenderness to palpation.  Cardiovascular: Regular rate and rhythm. Peripheral pulses 2+ bilaterally.  Chest/Pulmonary: Normal work of breathing. Lung sounds clear and equal throughout, no wheezes or crackles. No chest wall tenderness or deformities.  Abdomen: Soft, nondistended. Nontender without guarding or rebound.  Back/Spine: No CVA or midline tenderness.  Extremities: Normal ROM of all major joints. No lower extremity edema.   Skin: Warm and dry. Normal skin color.   Neuro: Speech clear. CNs grossly intact. Moves " all extremities appropriately. Strength and sensation grossly intact to all extremities.   Psych: Normal affect/mood, cooperative, memory appropriate.     LAB:  All pertinent labs reviewed and interpreted.  Labs Ordered and Resulted from Time of ED Arrival to Time of ED Departure - No data to display    RADIOLOGY:  CT Facial Bones without Contrast   Final Result   IMPRESSION:    1.  Nonspecific anterior subluxation of the bilateral temporomandibular joints.   2.  Cortical erosion of the left mandibular condyle. Findings may reflect asymmetric degenerative changes with subchondral cyst formation, inflammatory change, or infectious change in the correct clinical setting.                           PROCEDURES:   PROCEDURE: Reduction   INDICATIONS:  Bilateral jaw dislocation   PROCEDURE PROVIDER: Dr Millie Gaston   CONSENT: Risks, benefits and alternatives were discussed with and Verbal consent was obtained from Patient.   MEDICATIONS:  Oral Valium and ibuprofen   REDUCTION PROCEDURE DESCRIPTION:  Fingers placed on lower teeth with hands wrapped around the lower condyle of the jaw.  Slight anterior pressure was placed with downward pressure and posterior pressure with reduction of right-sided TMJ joint without issue, left-sided required further manipulation but was able to be reduced.  Patient tolerated procedure well.   COMPLICATIONS:  Patient tolerated procedure well, without complication         I, Priyanka Christian, am serving as a scribe to document services personally performed by Dr. Moni Gaston  based on my observation and the provider's statements to me. IMoni MD attest that Priyanka Christian is acting in a scribe capacity, has observed my performance of the services and has documented them in accordance with my direction.      Moni Gaston M.D.  Emergency Medicine  [facility] EMERGENCY DEPARTMENT  [facility] [facility] [id number] [phone]  Dept:  [phone]     Millie Gaston MD  03/29/22 0457

## 2022-03-29 NOTE — ED NOTES
Pt presents to ED for evaluation of bilateral jaw pain and concern that her jaw is dislocated. Pt is able to open and close her mouth upon instruction from doctor. Pt has hx TMJ. Pt is alert and orientedx3, breathing is easy and nonlabored. Pt is tearful during exam.

## 2022-03-30 NOTE — DISCHARGE INSTRUCTIONS
You were seen in the emergency department for recurrent TMJ dislocation.  Your dislocation was reduced with sedation.  Please keep the Ace wrap applied.  We spoke with [facility] this evening and they are going to plan to see you in clinic tomorrow at 9 AM.  You can call them with any questions.  You should continue a liquid diet at this point.  You can continue Valium as previously prescribed and ibuprofen for pain.  Would recommend ice packs for the next couple of days to help with swelling.

## 2022-03-30 NOTE — ED NOTES
Procedure was not successful. Pt remains sedated, but arousable to voice. RR 16 and sats 100% on 6lNC

## 2022-10-15 ENCOUNTER — APPOINTMENT (OUTPATIENT)
Dept: RADIOLOGY | Facility: HOSPITAL | Age: 37
End: 2022-10-15
Attending: EMERGENCY MEDICINE
Payer: COMMERCIAL

## 2022-10-15 ENCOUNTER — HOSPITAL ENCOUNTER (EMERGENCY)
Facility: HOSPITAL | Age: 37
Discharge: HOME | End: 2022-10-15
Attending: EMERGENCY MEDICINE | Admitting: EMERGENCY MEDICINE
Payer: COMMERCIAL

## 2022-10-15 ENCOUNTER — APPOINTMENT (OUTPATIENT)
Dept: ULTRASOUND IMAGING | Facility: HOSPITAL | Age: 37
End: 2022-10-15
Attending: EMERGENCY MEDICINE
Payer: COMMERCIAL

## 2022-10-15 VITALS
DIASTOLIC BLOOD PRESSURE: 74 MMHG | WEIGHT: 196 LBS | RESPIRATION RATE: 18 BRPM | HEART RATE: 70 BPM | SYSTOLIC BLOOD PRESSURE: 116 MMHG | OXYGEN SATURATION: 97 % | HEIGHT: 62 IN | TEMPERATURE: 97.5 F | BODY MASS INDEX: 36.07 KG/M2

## 2022-10-15 DIAGNOSIS — M25.531 RIGHT WRIST PAIN: ICD-10-CM

## 2022-10-15 DIAGNOSIS — M25.521 RIGHT ELBOW PAIN: ICD-10-CM

## 2022-10-15 LAB
ANION GAP SERPL CALCULATED.3IONS-SCNC: 12 MMOL/L (ref 7–15)
B BURGDOR IGG+IGM SER QL: 0.24
BASOPHILS # BLD AUTO: 0.1 10E3/UL (ref 0–0.2)
BASOPHILS NFR BLD AUTO: 0 %
BUN SERPL-MCNC: 17.7 MG/DL (ref 6–20)
CALCIUM SERPL-MCNC: 9.4 MG/DL (ref 8.6–10)
CHLORIDE SERPL-SCNC: 100 MMOL/L (ref 98–107)
CREAT SERPL-MCNC: 0.88 MG/DL (ref 0.51–0.95)
CRP SERPL-MCNC: 11.7 MG/L
DEPRECATED HCO3 PLAS-SCNC: 27 MMOL/L (ref 22–29)
EOSINOPHIL # BLD AUTO: 0.2 10E3/UL (ref 0–0.7)
EOSINOPHIL NFR BLD AUTO: 2 %
ERYTHROCYTE [DISTWIDTH] IN BLOOD BY AUTOMATED COUNT: 12.3 % (ref 10–15)
GFR SERPL CREATININE-BSD FRML MDRD: 86 ML/MIN/1.73M2
GLUCOSE SERPL-MCNC: 93 MG/DL (ref 70–99)
HCT VFR BLD AUTO: 42.2 % (ref 35–47)
HGB BLD-MCNC: 14 G/DL (ref 11.7–15.7)
HOLD SPECIMEN: NORMAL
IMM GRANULOCYTES # BLD: 0.1 10E3/UL
IMM GRANULOCYTES NFR BLD: 1 %
LYMPHOCYTES # BLD AUTO: 3.4 10E3/UL (ref 0.8–5.3)
LYMPHOCYTES NFR BLD AUTO: 29 %
MCH RBC QN AUTO: 31.1 PG (ref 26.5–33)
MCHC RBC AUTO-ENTMCNC: 33.2 G/DL (ref 31.5–36.5)
MCV RBC AUTO: 94 FL (ref 78–100)
MONOCYTES # BLD AUTO: 0.9 10E3/UL (ref 0–1.3)
MONOCYTES NFR BLD AUTO: 8 %
NEUTROPHILS # BLD AUTO: 7.2 10E3/UL (ref 1.6–8.3)
NEUTROPHILS NFR BLD AUTO: 60 %
NRBC # BLD AUTO: 0 10E3/UL
NRBC BLD AUTO-RTO: 0 /100
PLATELET # BLD AUTO: 360 10E3/UL (ref 150–450)
POTASSIUM SERPL-SCNC: 3.5 MMOL/L (ref 3.4–5.3)
RBC # BLD AUTO: 4.5 10E6/UL (ref 3.8–5.2)
SODIUM SERPL-SCNC: 139 MMOL/L (ref 136–145)
WBC # BLD AUTO: 11.7 10E3/UL (ref 4–11)

## 2022-10-15 PROCEDURE — 250N000011 HC RX IP 250 OP 636: Performed by: EMERGENCY MEDICINE

## 2022-10-15 PROCEDURE — 82310 ASSAY OF CALCIUM: CPT | Performed by: EMERGENCY MEDICINE

## 2022-10-15 PROCEDURE — 85025 COMPLETE CBC W/AUTO DIFF WBC: CPT | Performed by: EMERGENCY MEDICINE

## 2022-10-15 PROCEDURE — 96374 THER/PROPH/DIAG INJ IV PUSH: CPT

## 2022-10-15 PROCEDURE — 86618 LYME DISEASE ANTIBODY: CPT | Performed by: EMERGENCY MEDICINE

## 2022-10-15 PROCEDURE — 93971 EXTREMITY STUDY: CPT | Mod: RT

## 2022-10-15 PROCEDURE — 99285 EMERGENCY DEPT VISIT HI MDM: CPT | Mod: 25

## 2022-10-15 PROCEDURE — 86140 C-REACTIVE PROTEIN: CPT | Performed by: EMERGENCY MEDICINE

## 2022-10-15 PROCEDURE — 73080 X-RAY EXAM OF ELBOW: CPT | Mod: RT

## 2022-10-15 PROCEDURE — 250N000013 HC RX MED GY IP 250 OP 250 PS 637: Performed by: EMERGENCY MEDICINE

## 2022-10-15 PROCEDURE — 36415 COLL VENOUS BLD VENIPUNCTURE: CPT | Performed by: EMERGENCY MEDICINE

## 2022-10-15 RX ORDER — OXYCODONE HYDROCHLORIDE 5 MG/1
5 TABLET ORAL ONCE
Status: COMPLETED | OUTPATIENT
Start: 2022-10-15 | End: 2022-10-15

## 2022-10-15 RX ORDER — KETOROLAC TROMETHAMINE 15 MG/ML
15 INJECTION, SOLUTION INTRAMUSCULAR; INTRAVENOUS ONCE
Status: COMPLETED | OUTPATIENT
Start: 2022-10-15 | End: 2022-10-15

## 2022-10-15 RX ADMIN — KETOROLAC TROMETHAMINE 15 MG: 15 INJECTION, SOLUTION INTRAMUSCULAR; INTRAVENOUS at 06:16

## 2022-10-15 RX ADMIN — OXYCODONE HYDROCHLORIDE 5 MG: 5 TABLET ORAL at 06:19

## 2022-10-15 ASSESSMENT — ENCOUNTER SYMPTOMS
SHORTNESS OF BREATH: 0
FEVER: 0
ABDOMINAL PAIN: 0
WOUND: 0

## 2022-10-15 ASSESSMENT — ACTIVITIES OF DAILY LIVING (ADL)
ADLS_ACUITY_SCORE: 35

## 2022-10-15 NOTE — ED NOTES
Pt is now awake and alert and has been talking to her sister on the phone. She is up in her room, has had good oral intake, and is ready to discharge.

## 2022-10-15 NOTE — ED PROVIDER NOTES
EMERGENCY DEPARTMENT ENCOUNTER      NAME: Katelynn Cronin  AGE: 37 year old female  YOB: 1985  MRN: [id number]  EVALUATION DATE & TIME: No admission date for patient encounter.    PCP: Lokesh Logan    ED PROVIDER: Briana Dominguez M.D.        Chief Complaint   Patient presents with     Arm Pain     right         FINAL IMPRESSION:    1. Right elbow pain    2. Right wrist pain            MEDICAL DECISION MAKIN year old female who presents emergency department for dental pain that began earlier today.  Pain has not gotten worse.  Denies any trauma.  No significant erythema or warmth.  Patient signed out at change of shift awaiting laboratory evaluation, radiology imaging, and disposition.      ED COURSE:  4:23 AM  I met with the patient to gather history and perform my exam. ED course and treatment discussed.    5:00 AM  Pt signed out at change of shift awaiting lab and imaging results.  Low suspicion for true septic joint.  No real significant trauma.  Likely more of an arthritis type picture, unclear etiology.  We will send Lyme test though this would not come back today.  Will treat with Toradol and oxycodone here in the ER.  Less likely DVT, but will do ultrasound for confirmation.    No signs for arterial occlusion.  Nothing to suggest ACS, PE, or aortic catastrophe as etiology of pain.  Does not sound like acute spinal cord abnormality.    COVID-19 PPE worn during patient evaluation:  Mask: n95 and homemade masks   Eye Protection: goggles   Gown: none   Hair cover: yes  Face shield: none   Patient wearing a mask: yes       Mental Health Risk Assessment      PSS-3    Date and Time Over the past 2 weeks have you felt down, depressed, or hopeless? Over the past 2 weeks have you had thoughts of killing yourself? Have you ever attempted to kill yourself? When did this last happen? User   10/15/22 0356 no no yes more than 6 months ago [place]                Item Assessment   Suicidal Ideation  None   Plan none   Intent none   Suicidal or self-harm behaviors none   Risk Factors prior thoughts of suicide >6 months ago   Protective Factors Supportive social network of family and/or friends          CONSULTANTS:  none        MEDICATIONS GIVEN IN THE EMERGENCY:  Medications   ketorolac (TORADOL) injection 15 mg (has no administration in time range)   oxyCODONE (ROXICODONE) tablet 5 mg (has no administration in time range)           NEW PRESCRIPTIONS STARTED AT TODAY'S ER VISIT     Medication List      There are no discharge medications for this visit.             CONDITION:  stable        DISPOSITION:  pending         =================================================================  =================================================================    HPI    Patient information was obtained from: patient    Use of Intrepreter: N/A      Katelynn Cronin is a 37 year old female with history of lateral epicondylitis (right elbow), anxiety, and depression who presents to the ER with complaints of arm pain.      Yesterday (10/14), Patient reports severe right inner arm pain near her elbow. She was working her first shift yesterday morning and noticed slight discomfort in her right arm. After coming back home from her second shift that day, she was getting ready for bed when she developed increased discomfort and was unable to move her arm without pain. She reports that her right arm felt bruised and warm to touch. Patient did not take anything for relief. She endorses pain worsens with palpation. Denies any other symptoms or complaints. Patient is allergic to latex and pollen. She denies possibility of pregnancy.    REVIEW OF SYSTEMS  Review of Systems   Constitutional: Negative for fever.   Respiratory: Negative for shortness of breath.    Cardiovascular: Negative for chest pain.   Gastrointestinal: Negative for abdominal pain.   Musculoskeletal:        +right elbow and wrist pain   Skin: Negative for rash and  wound.   Psychiatric/Behavioral: Negative for suicidal ideas.   All other systems reviewed and are negative.          PAST MEDICAL HISTORY:  Past Medical History:   Diagnosis Date     Acute midline low back pain without sciatica 2/10/2019    Formatting of this note might be different from the original. Slipped and fell directly onto her back on 2/4/2019. Was seen at [facility] ER on 2/4/2019 and lumbar spine x-rays showed an age indeterminate 20% compression of L2 (but patient was not found to have tenderness to palpation over L2 on clinical exam in the ER), and lumbar spine, pelvis and left hip x-rays were otherwise unremarkable     Alcohol abuse 8/31/2018    Formatting of this note might be different from the original. [facility] ER visit on 6/29/2018 for panic attack with alcohol intoxication (blood alcohol level 0.125).  Hospitalization at [facility] 8/13-8/20/2018 for alcohol intoxication (MILTON 0.16) and acetaminophen overdose. Patient was voluntarily admitted to the inpatient psychiatric unit for observation and treatment following medi     Allergic rhinitis 5/7/2014    Formatting of this note might be different from the original. Her symptoms are worst during the Spring (?) Skin Prick Testing was performed on: 6/13/2014  Sensitive to: English Plantain & Molds     Amblyopia 7/8/2010    Formatting of this note might be different from the original. Right eye.     Anxiety 2/29/2012    Formatting of this note might be different from the original. Zoloft started 2/29/12.  Switched to Effexor XR 1/15/2014.     MACARIO III (cervical intraepithelial neoplasia grade III) with severe dysplasia 10/29/2013    Formatting of this note might be different from the original. 09/04/2012 ASCUSHPV+ 10/02/2012 [place] MACARIO I 09/10/2013 [facility]/ASCUS/HPV+ 10/10/2013 [place] MACARIO II 10/29/2013 [facility] MACARIO II-III, Negative margins 01/29/2015 NIL/HPV negative 03/06/2015 NIL/HPV negative 05/17/2016 NIL/HPV negative 02/28/2019 NIL/HPV  negative  ASCCP recommends:  History of CIN2 - CIN3:  Pap and HPV every 3 years for 20 years.   Pl     Dysthymic disorder 1/25/2013     Eczema 2/19/2015     Hypercholesterolemia 2/19/2015    Formatting of this note might be different from the original. Mildly elevated total and LDL cholesterol levels, along with a low HDL level, noted 2/19/2015.     Lateral epicondylitis of right elbow 8/28/2014     Moderate episode of recurrent major depressive disorder (H) 3/4/2019    Formatting of this note might be different from the original. Zoloft started 2/29/12. Previously was on medication from [id number], and briefly again in 2009 for postpartum depression. Effexor XR started on 1/15/2014. She is also following with Psychology for counseling therapy.     Overweight 6/29/2009     PPH (postpartum hemorrhage) 3/13/2009     Vitamin D deficiency 6/13/2014    Formatting of this note might be different from the original. Treatment started 6/13/2014 with Vitamin D 5,000 units daily per Allergy specialist.     Vocal cord dysfunction 6/15/2014    Formatting of this note might be different from the original. Diagnosed by Allergy specialist 6/13/2014, and treatment with Speech Therapy offered if the patient wishes to pursue this.         PAST SURGICAL HISTORY:  History reviewed. No pertinent surgical history.      CURRENT MEDICATIONS:    Prior to Admission medications    Medication Sig Start Date End Date Taking? Authorizing Provider   cetirizine (ZYRTEC) 10 MG tablet [CETIRIZINE (ZYRTEC) 10 MG TABLET] Take 10 mg by mouth. 1/28/20   Provider, Historical   diazepam (VALIUM) 5 MG tablet Take 1 tablet (5 mg) by mouth every 6 hours as needed for muscle spasms 3/29/22   Moni Gaston MD   gabapentin (NEURONTIN) 100 MG capsule [GABAPENTIN (NEURONTIN) 100 MG CAPSULE] Take 100 mg by mouth. 12/9/20   Provider, Historical   methocarbamol (ROBAXIN) 750 MG tablet Take 1-2 tablets (750-1,500 mg) by mouth 4 times daily as needed for muscle spasms  "8/25/21   Yessy Woo MD   naproxen (NAPROSYN) 500 MG tablet [NAPROXEN (NAPROSYN) 500 MG TABLET] Take 500 mg by mouth. 12/3/20   Provider, Historical   QUEtiapine (SEROQUEL) 50 MG tablet [QUETIAPINE (SEROQUEL) 50 MG TABLET] Take 50 mg by mouth. 3/15/21   Provider, Historical   selenium sulfide 2.25 % Sham [SELENIUM SULFIDE 2.25 % SHAM] Apply 6 mL topically daily. 5/2/19   Shira Martinez PA-C   venlafaxine (EFFEXOR-XR) 150 MG 24 hr capsule [VENLAFAXINE (EFFEXOR-XR) 150 MG 24 HR CAPSULE] TAKE 2 CAPSULES BY MOUTH EVERY DAY WITH A MEAL 3/24/21   Provider, Historical         ALLERGIES:  Allergies   Allergen Reactions     Grass Extracts [Gramineae Pollens] Rash and Swelling     Swelling, itching, and no voice   Swelling, itching, and no voice        Latex Rash     Pollen Extracts [Pollen Extract] Rash     Swelling, itching, and no voice          FAMILY HISTORY:  No family history on file.      SOCIAL HISTORY:  Social History     Socioeconomic History     Marital status: Single         VITALS:  Patient Vitals for the past 24 hrs:   BP Temp Temp src Pulse Resp SpO2 Height Weight   10/15/22 0352 110/77 97.5  F (36.4  C) Temporal 93 16 97 % 1.575 m (5' 2\") 88.9 kg (196 lb)       Wt Readings from Last 3 Encounters:   10/15/22 88.9 kg (196 lb)   03/29/22 92 kg (202 lb 13.2 oz)   03/29/22 92 kg (202 lb 13.2 oz)       CrCl cannot be calculated (Patient's most recent lab result is older than the maximum 30 days allowed.).    PHYSICAL EXAM    Constitutional:  Well developed, Well nourished, NAD, GCS 15  HENT:  Normocephalic, Atraumatic, Bilateral external ears normal, Nose normal. Neck- Supple, No stridor.   Eyes:  PERRL, EOMI, Conjunctiva normal, No discharge.  Respiratory:  Normal breath sounds, No respiratory distress, No wheezing, Speaks full sentences easily. No cough.   Cardiovascular:  Normal heart rate, Regular rhythm  GI:  No excessive obesity.   : deferred  Musculoskeletal: 2+ radial pulses. No edema.No " cyanosis, No clubbing.  No major deformities noted. +right elbow tenderness medially and laterally but no anteriorly in the anticubital fossa. +tenderness of olecranon.  No erythema or warmth appreciated.   She also has some tenderness to the right wrist without any swelling, erythema, or warmth.  Integument:  Warm, Dry, No erythema, No rash.  No petechiae.  Neurologic:  Alert & oriented x 3, Normal motor function, Normal sensory function, No focal deficits noted. Normal gait.   Psychiatric:  Affect normal, Cooperative         LAB:  All pertinent labs reviewed and interpreted.  No results found for this or any previous visit (from the past 24 hour(s)).    No results found for: ABORH        RADIOLOGY:  Reviewed all pertinent imaging. Please see official radiology report.    Elbow XR, G/E 3 views, right    (Results Pending)   US Upper Extremity Venous Duplex Right    (Results Pending)         EKG:    none      PROCEDURES:  none    Medical Decision Making    Supplemental history from: N/A    External Record(s) Reviewed: N/A    Differential Diagnosis: See MDM charting for differential considered.     I performed an independent interpretation of the: N/A    Discussed with radiology regarding test interpretation: N/A    Discussion of management with another provider: N/A    The following testing was considered but ultimately not selected: None    I considered prescription management with: N/A    The patient's care impacted: None    Consideration of Admission/Observation: Admission considered: pending work up/clinical reassessment by oncoming ED provider    Care significantly affected by Social Determinants of Health including: N/A          MAYANK, Irene Barnes, am serving as a scribe to document services personally performed by Dr. Briana Dominguez based on my observation and the provider's statements to me. I, Dr. Briana Dominguez MD attest that Irene Barnes is acting in a scribe capacity, has observed my performance of the  services and has documented them in accordance with my direction.        Briana Dominguez M.D. [facility]  Emergency Medicine and Medical Toxicology  Formerly [facility] EMERGENCY DEPARTMENT  [address] [place] [id number] [phone]  Dept: [phone]           Briana Dominguez MD  10/15/22 0457

## 2022-10-15 NOTE — ED TRIAGE NOTES
Pt here with right arm pain that started today while at work. She denies an injury. Tonight was massaging it to try to relieve some of the pain and pain got worse. Now feels like she has a hard time moving it due to pain. Is able to move and bend at joints, color good.      Triage Assessment     Row Name 10/15/22 0353       Triage Assessment (Adult)    Airway WDL WDL       Respiratory WDL    Respiratory WDL WDL       Skin Circulation/Temperature WDL    Skin Circulation/Temperature WDL WDL       Cardiac WDL    Cardiac WDL WDL       Peripheral/Neurovascular WDL    Peripheral Neurovascular WDL WDL       Cognitive/Neuro/Behavioral WDL    Cognitive/Neuro/Behavioral WDL WDL

## 2022-10-15 NOTE — ED NOTES
Pt is sleeping soundly, no apnea noted. Pt is drowsy when she awakens to my voice. She has her call light in hand. Her speech is slow and clear.

## 2022-10-15 NOTE — Clinical Note
Katelynn Cronin was seen and treated in our emergency department on 10/15/2022.  She may return to work on 10/21/2022.  Lifting and exertional activities at work only as tolerated for the next 5 to 7 days.     If you have any questions or concerns, please don't hesitate to call.      Arnol Shah MD

## 2022-10-15 NOTE — ED PROVIDER NOTES
"ED SIGNOUT  Date/Time:10/15/2022 5:38 AM  6:57 AM exam is benign, patient appears Well and comfortable.      Patient signed out to me by my colleague, Briana Dominguez MD.  Please see their note for complete history and physical. Plan to follow up on pending labs and imaging studies.    The creation of this record is based on the scribe s observations of the work being performed by Arnol Shah MD and the provider s statements to them. It was created on their behalf by Meghan ellis trained medical scribe. This document has been checked and approved by the attending provider.      REMAINING ED WORKUP:    Vitals:  /74   Pulse 75   Temp 97.5  F (36.4  C) (Temporal)   Resp 16   Ht 1.575 m (5' 2\")   Wt 88.9 kg (196 lb)   SpO2 97%   BMI 35.85 kg/m        Pertinent labs results reviewed   Results for orders placed or performed during the hospital encounter of 10/15/22   Elbow XR, G/E 3 views, right    Impression    IMPRESSION: Normal joint spaces and alignment. No fracture or joint effusion.   US Upper Extremity Venous Duplex Right    Impression    IMPRESSION:  1.  No deep venous thrombosis in the right upper extremity.   Basic metabolic panel   Result Value Ref Range    Sodium 139 136 - 145 mmol/L    Potassium 3.5 3.4 - 5.3 mmol/L    Chloride 100 98 - 107 mmol/L    Carbon Dioxide (CO2) 27 22 - 29 mmol/L    Anion Gap 12 7 - 15 mmol/L    Urea Nitrogen 17.7 6.0 - 20.0 mg/dL    Creatinine 0.88 0.51 - 0.95 mg/dL    Calcium 9.4 8.6 - 10.0 mg/dL    Glucose 93 70 - 99 mg/dL    GFR Estimate 86 >60 mL/min/1.73m2   Result Value Ref Range    CRP Inflammation 11.70 (H) <5.00 mg/L   CBC with platelets and differential   Result Value Ref Range    WBC Count 11.7 (H) 4.0 - 11.0 10e3/uL    RBC Count 4.50 3.80 - 5.20 10e6/uL    Hemoglobin 14.0 11.7 - 15.7 g/dL    Hematocrit 42.2 35.0 - 47.0 %    MCV 94 78 - 100 fL    MCH 31.1 26.5 - 33.0 pg    MCHC 33.2 31.5 - 36.5 g/dL    RDW 12.3 10.0 - 15.0 %    Platelet Count 360 150 - " 450 10e3/uL    % Neutrophils 60 %    % Lymphocytes 29 %    % Monocytes 8 %    % Eosinophils 2 %    % Basophils 0 %    % Immature Granulocytes 1 %    NRBCs per 100 WBC 0 <1 /100    Absolute Neutrophils 7.2 1.6 - 8.3 10e3/uL    Absolute Lymphocytes 3.4 0.8 - 5.3 10e3/uL    Absolute Monocytes 0.9 0.0 - 1.3 10e3/uL    Absolute Eosinophils 0.2 0.0 - 0.7 10e3/uL    Absolute Basophils 0.1 0.0 - 0.2 10e3/uL    Absolute Immature Granulocytes 0.1 <=0.4 10e3/uL    Absolute NRBCs 0.0 10e3/uL       Pertinent imaging reviewed   Please see official radiology report.  US Upper Extremity Venous Duplex Right   Final Result   IMPRESSION:   1.  No deep venous thrombosis in the right upper extremity.      Elbow XR, G/E 3 views, right   Final Result   IMPRESSION: Normal joint spaces and alignment. No fracture or joint effusion.           Interventions  Medications   ketorolac (TORADOL) injection 15 mg (15 mg Intravenous Given 10/15/22 0616)   oxyCODONE (ROXICODONE) tablet 5 mg (5 mg Oral Given 10/15/22 0619)        ED Course/MDM:  5:25 Signout accepted from Briana Dominguze.  Prior records were reviewed.  Diagnostics from this visit are reviewed.  6:57 AM I introduced myself to the patient.       Again, patient is a 77-year-old female who presents with right arm pain.  Imaging studies reported no acute concerning findings.  Labs did show elevated CRP but otherwise no significant elevated white blood cell count.  On my exam the patient has no erythema or warmth over the area of pain with certainly nothing to suggest cellulitis, septic joint, necrotizing fasciitis, or other more malicious etiology of symptoms.  She is neurovascular intact with certainly nothing to suggest compartment syndrome.  With reassuring vitals, benign exam and negative work-up feel she is safe for discharge close follow-up.  Possibly bursitis or other musculoskeletal strain/sprain.  Will provide sling and recommend gentle range of motion activities, conservative  management with Tylenol ibuprofen and close follow-up with her primary care provider in the next 5 to 7 days.  Discussed these findings recommendations with the patient felt reassured and comfortable discharge.  Return precautions provided.        1. Right elbow pain    2. Right wrist pain          Arnol Shah MD  [facility] Emergency Department          Arnol Shah MD  10/15/22 0731

## 2022-10-15 NOTE — DISCHARGE INSTRUCTIONS
Use the sling only as needed for discomfort but continue to do gentle range of motion exercises. You can take 650 mg of tylenol every 6-8 hours (no more than 3000 mg in 24 hours).  You can take 400 mg of ibuprofen with food every 6-8 hours (no more than 3200 mg in 24 hours).   If needed you can alternate between the two (take tylenol, then 3 hours later take a dose of ibuprofen, then 3 hours later take a dose of tylenol)

## 2022-10-15 NOTE — ED NOTES
Katelynn still sleeps continuously until aroused by voice. Her fruit cup and juice are untouched. I turn her lights on and encourage her to eat/drink.

## 2023-01-10 ENCOUNTER — HOSPITAL ENCOUNTER (EMERGENCY)
Facility: HOSPITAL | Age: 38
Discharge: HOME | End: 2023-01-10
Admitting: NURSE PRACTITIONER
Payer: COMMERCIAL

## 2023-01-10 VITALS
BODY MASS INDEX: 36.03 KG/M2 | DIASTOLIC BLOOD PRESSURE: 90 MMHG | RESPIRATION RATE: 16 BRPM | HEART RATE: 79 BPM | SYSTOLIC BLOOD PRESSURE: 124 MMHG | WEIGHT: 197 LBS | TEMPERATURE: 97.9 F | OXYGEN SATURATION: 99 %

## 2023-01-10 DIAGNOSIS — L72.9 SCALP CYST: ICD-10-CM

## 2023-01-10 DIAGNOSIS — R11.0 NAUSEA: ICD-10-CM

## 2023-01-10 LAB
ANION GAP SERPL CALCULATED.3IONS-SCNC: 10 MMOL/L (ref 7–15)
BASOPHILS # BLD AUTO: 0.1 10E3/UL (ref 0–0.2)
BASOPHILS NFR BLD AUTO: 1 %
BUN SERPL-MCNC: 13.7 MG/DL (ref 6–20)
CALCIUM SERPL-MCNC: 10.2 MG/DL (ref 8.6–10)
CHLORIDE SERPL-SCNC: 100 MMOL/L (ref 98–107)
CREAT SERPL-MCNC: 0.94 MG/DL (ref 0.51–0.95)
DEPRECATED HCO3 PLAS-SCNC: 28 MMOL/L (ref 22–29)
EOSINOPHIL # BLD AUTO: 0.3 10E3/UL (ref 0–0.7)
EOSINOPHIL NFR BLD AUTO: 4 %
ERYTHROCYTE [DISTWIDTH] IN BLOOD BY AUTOMATED COUNT: 12.2 % (ref 10–15)
GFR SERPL CREATININE-BSD FRML MDRD: 79 ML/MIN/1.73M2
GLUCOSE SERPL-MCNC: 103 MG/DL (ref 70–99)
HCG SERPL QL: NEGATIVE
HCT VFR BLD AUTO: 45.2 % (ref 35–47)
HGB BLD-MCNC: 15.4 G/DL (ref 11.7–15.7)
IMM GRANULOCYTES # BLD: 0 10E3/UL
IMM GRANULOCYTES NFR BLD: 0 %
LYMPHOCYTES # BLD AUTO: 2.3 10E3/UL (ref 0.8–5.3)
LYMPHOCYTES NFR BLD AUTO: 27 %
MAGNESIUM SERPL-MCNC: 2.2 MG/DL (ref 1.7–2.3)
MCH RBC QN AUTO: 32.1 PG (ref 26.5–33)
MCHC RBC AUTO-ENTMCNC: 34.1 G/DL (ref 31.5–36.5)
MCV RBC AUTO: 94 FL (ref 78–100)
MONOCYTES # BLD AUTO: 0.7 10E3/UL (ref 0–1.3)
MONOCYTES NFR BLD AUTO: 8 %
NEUTROPHILS # BLD AUTO: 5.1 10E3/UL (ref 1.6–8.3)
NEUTROPHILS NFR BLD AUTO: 60 %
NRBC # BLD AUTO: 0 10E3/UL
NRBC BLD AUTO-RTO: 0 /100
PLATELET # BLD AUTO: 346 10E3/UL (ref 150–450)
POTASSIUM SERPL-SCNC: 4.3 MMOL/L (ref 3.4–5.3)
RBC # BLD AUTO: 4.8 10E6/UL (ref 3.8–5.2)
SODIUM SERPL-SCNC: 138 MMOL/L (ref 136–145)
WBC # BLD AUTO: 8.4 10E3/UL (ref 4–11)

## 2023-01-10 PROCEDURE — 85025 COMPLETE CBC W/AUTO DIFF WBC: CPT | Performed by: EMERGENCY MEDICINE

## 2023-01-10 PROCEDURE — 99284 EMERGENCY DEPT VISIT MOD MDM: CPT

## 2023-01-10 PROCEDURE — 83735 ASSAY OF MAGNESIUM: CPT | Performed by: EMERGENCY MEDICINE

## 2023-01-10 PROCEDURE — 80048 BASIC METABOLIC PNL TOTAL CA: CPT | Performed by: EMERGENCY MEDICINE

## 2023-01-10 PROCEDURE — 36415 COLL VENOUS BLD VENIPUNCTURE: CPT | Performed by: EMERGENCY MEDICINE

## 2023-01-10 PROCEDURE — 250N000011 HC RX IP 250 OP 636: Performed by: NURSE PRACTITIONER

## 2023-01-10 PROCEDURE — 250N000013 HC RX MED GY IP 250 OP 250 PS 637: Performed by: NURSE PRACTITIONER

## 2023-01-10 PROCEDURE — 84703 CHORIONIC GONADOTROPIN ASSAY: CPT | Performed by: EMERGENCY MEDICINE

## 2023-01-10 RX ORDER — ONDANSETRON 4 MG/1
4 TABLET, ORALLY DISINTEGRATING ORAL EVERY 8 HOURS PRN
Qty: 10 TABLET | Refills: 0 | Status: SHIPPED | OUTPATIENT
Start: 2023-01-10 | End: 2023-01-13

## 2023-01-10 RX ORDER — IBUPROFEN 600 MG/1
600 TABLET, FILM COATED ORAL ONCE
Status: COMPLETED | OUTPATIENT
Start: 2023-01-10 | End: 2023-01-10

## 2023-01-10 RX ORDER — CEPHALEXIN 500 MG/1
500 CAPSULE ORAL 3 TIMES DAILY
Qty: 21 CAPSULE | Refills: 0 | Status: SHIPPED | OUTPATIENT
Start: 2023-01-10 | End: 2023-01-17

## 2023-01-10 RX ORDER — ONDANSETRON 4 MG/1
4 TABLET, ORALLY DISINTEGRATING ORAL ONCE
Status: COMPLETED | OUTPATIENT
Start: 2023-01-10 | End: 2023-01-10

## 2023-01-10 RX ADMIN — ONDANSETRON 4 MG: 4 TABLET, ORALLY DISINTEGRATING ORAL at 12:03

## 2023-01-10 RX ADMIN — IBUPROFEN 600 MG: 600 TABLET, FILM COATED ORAL at 12:03

## 2023-01-10 NOTE — ED NOTES
ED Provider In Triage Note  [facility]  Encounter Date: Juan C 10, 2023    Chief Complaint   Patient presents with     Nausea     Cyst       Brief HPI:   Katelynn Cronin is a 37 year old female presenting to the Emergency Department with a chief complaint of cyst on the top of her head that became infected last week.  Pt reports she took antibx (likely bactrim) for the cyst and having nausea and fatigue.  Reports fever x1 last week. Pt seen at urgent care today and told to come to the ED.  Denies any other associated symptoms.    Brief Physical Exam:  BP (!) 124/90   Pulse 79   Temp 97.9  F (36.6  C) (Temporal)   Resp 16   Wt 89.4 kg (197 lb)   SpO2 99%   BMI 36.03 kg/m    General: Non-toxic appearing  HEENT: Atraumatic  Resp: No respiratory distress  Abdomen: Non-peritoneal  Neuro: Alert, oriented, answers questions appropriately  Psych: Behavior appropriate      Plan Initiated in Triage:  Labs      PIT Dispo:   Return to lobby while awaiting workup and ED bed availability    Sean Cunningham MD on 1/10/2023 at 11:21 AM    Patient was evaluated by the Physician in Triage due to a limitation of available rooms in the Emergency Department. A plan of care was discussed based on the information obtained on the initial evaluation and patient was consuled to return back to the Emergency Department lobby after this initial evalutaiton until results were obtained or a room became available in the Emergency Department. Patient was counseled not to leave prior to receiving the results of their workup.     Sean Cunningham MD  [facility] EMERGENCY DEPARTMENT  [address] [place] [id number]  [phone]       Sean Cunningham MD  01/10/23 1123

## 2023-01-10 NOTE — LETTER
[facility] Emergency Department  [address] [place] [id number]  Phone: [phone]   January 10, 2023    Patient: Katelynn Cronin   YOB: 1985   Date of Visit: 1/10/2023       To Whom It May Concern:    Katelynn Cronin was present in our emergency department on 1/10/2023.    Please excuse from work on 1/10/2023 due to illness.    Sincerely,     [facility] Emergency Department

## 2023-01-10 NOTE — DISCHARGE INSTRUCTIONS
Start taking the new antibiotic for your scalp cyst.    Take ondansetron (zofran) as prescribed for nausea    Apply a warm back to the cyst for 20 minutes 3 times daily for 2-3 days.     Call dermatology to schedule follow up treatment of your cyst.    Return to the ER for fever or other concerns.

## 2023-01-10 NOTE — ED TRIAGE NOTES
Cyst on back of head present for years. States it got infected last week. Has been taking antibiotic. Nausea and fatigue now. No fevers currently.

## 2023-01-10 NOTE — ED PROVIDER NOTES
EMERGENCY DEPARTMENT ENCOUNTER      NAME: Katelynn Cronin  AGE: 37 year old female  YOB: 1985  MRN: [id number]  EVALUATION DATE & TIME: 1/10/2023 11:26 AM    PCP: Lokesh Logan    ED PROVIDER: LUISA Santos, CNP      Chief Complaint   Patient presents with     Nausea     Cyst         FINAL IMPRESSION:  1. Scalp cyst    2. Nausea          ED COURSE & MEDICAL DECISION MAKIN:37 AM I met with the patient, obtained history, performed an initial exam, and discussed options and plan for treatment here in the ED.  12:34 PM I rechecked and updated the patient.    Pertinent Labs & Imaging studies reviewed. (See chart for details)  37 year old female presents to the Emergency Department for evaluation of nausea and scalp cyst. Etiology for nausea unclear. Not pregnant. Could be medication side effect. Labs essentially normal. No headache, abdominal pain or fever. Cyst on the scalp is tender. No erythema. No crepitus or fluctuance. Just finished a course of bactrim DS. I don't feel she needs emergent I&D. Will refer to dermatology. Will also keep on keflex. Also prescribed zofran. Provided with return precautions.       Medical Decision Making    History:    Supplemental history from: Documented in HPI, if applicable    External Record(s) reviewed: Documented in HPI, if applicable.    Work Up:    Chart documentation includes differential considered and any EKGs or imaging independently interpreted by provider.    In additional to work up documented, I considered the following work up: See chart documentation, if applicable.    External consultation:    Discussion of management with another provider: See chart documentation, if applicable    Complicating factors:    Care impacted by chronic illness: N/A    Care affected by social determinants of health: N/A    Disposition considerations: Discharge. I prescribed additional prescription strength medication(s) as charted. N/A.        At the conclusion  of the encounter I discussed the results of all of the tests and the disposition. The questions were answered. The patient or family acknowledged understanding and was agreeable with the care plan.       MEDICATIONS GIVEN IN THE EMERGENCY:  Medications   ibuprofen (ADVIL/MOTRIN) tablet 600 mg (600 mg Oral Given 1/10/23 1203)   ondansetron (ZOFRAN ODT) ODT tab 4 mg (4 mg Oral Given 1/10/23 1203)       NEW PRESCRIPTIONS STARTED AT TODAY'S ER VISIT  New Prescriptions    CEPHALEXIN (KEFLEX) 500 MG CAPSULE    Take 1 capsule (500 mg) by mouth 3 times daily for 7 days    ONDANSETRON (ZOFRAN ODT) 4 MG ODT TAB    Take 1 tablet (4 mg) by mouth every 8 hours as needed for nausea            =================================================================    Patient information was obtained from: Patient    Use of Intrepreter: N/A     Limitations to History: None    HPI    Katelynn Cronin is a 37 year old female with a history of anxiety, alcohol abuse, hypercholesterolemia, and postpartum hemorrhage who presents to the ED by walk in for evaluation of nausea.    External Records Reviewed:  Per Chart Review,  1/3/23 The patient presented to [facility] for evaluation of a cyst. Patient reported that she has had this cyst for a long time but a few days before this visit it became painful. Patient was prescribed Bactrim DS for the infection.     Patient reports that since 1/8/22 she has been nauseas and fatigued when she stands up but she is able to resole the symptoms by sitting or lying down. Patient notes that she finished her course of Bactrim DS last night (1/9/23). Patient denies fevers, chills, abdominal pain, chance of pregnancy, chest pain, shortness of breath, otalgia, or any other concerns at this time.    PAST MEDICAL HISTORY:  Past Medical History:   Diagnosis Date     Acute midline low back pain without sciatica 2/10/2019    Formatting of this note might be different from the original. Slipped  and fell directly onto her back on 2/4/2019. Was seen at [facility] ER on 2/4/2019 and lumbar spine x-rays showed an age indeterminate 20% compression of L2 (but patient was not found to have tenderness to palpation over L2 on clinical exam in the ER), and lumbar spine, pelvis and left hip x-rays were otherwise unremarkable     Alcohol abuse 8/31/2018    Formatting of this note might be different from the original. [facility] ER visit on 6/29/2018 for panic attack with alcohol intoxication (blood alcohol level 0.125).  Hospitalization at [facility] 8/13-8/20/2018 for alcohol intoxication (MILTON 0.16) and acetaminophen overdose. Patient was voluntarily admitted to the inpatient psychiatric unit for observation and treatment following medi     Allergic rhinitis 5/7/2014    Formatting of this note might be different from the original. Her symptoms are worst during the Spring (?) Skin Prick Testing was performed on: 6/13/2014  Sensitive to: English Plantain & Molds     Amblyopia 7/8/2010    Formatting of this note might be different from the original. Right eye.     Anxiety 2/29/2012    Formatting of this note might be different from the original. Zoloft started 2/29/12.  Switched to Effexor XR 1/15/2014.     MACARIO III (cervical intraepithelial neoplasia grade III) with severe dysplasia 10/29/2013    Formatting of this note might be different from the original. 09/04/2012 ASCUSHPV+ 10/02/2012 [place] MACARIO I 09/10/2013 AGC/ASCUS/HPV+ 10/10/2013 [place] MACARIO II 10/29/2013 [facility] MACARIO II-III, Negative margins 01/29/2015 NIL/HPV negative 03/06/2015 NIL/HPV negative 05/17/2016 NIL/HPV negative 02/28/2019 NIL/HPV negative  ASCCP recommends:  History of CIN2 - CIN3:  Pap and HPV every 3 years for 20 years.   Pl     Dysthymic disorder 1/25/2013     Eczema 2/19/2015     Hypercholesterolemia 2/19/2015    Formatting of this note might be different from the original. Mildly elevated total and LDL cholesterol levels, along with a  low HDL level, noted 2/19/2015.     Lateral epicondylitis of right elbow 8/28/2014     Moderate episode of recurrent major depressive disorder (H) 3/4/2019    Formatting of this note might be different from the original. Zoloft started 2/29/12. Previously was on medication from [id number], and briefly again in 2009 for postpartum depression. Effexor XR started on 1/15/2014. She is also following with Psychology for counseling therapy.     Overweight 6/29/2009     PPH (postpartum hemorrhage) 3/13/2009     Vitamin D deficiency 6/13/2014    Formatting of this note might be different from the original. Treatment started 6/13/2014 with Vitamin D 5,000 units daily per Allergy specialist.     Vocal cord dysfunction 6/15/2014    Formatting of this note might be different from the original. Diagnosed by Allergy specialist 6/13/2014, and treatment with Speech Therapy offered if the patient wishes to pursue this.     PAST SURGICAL HISTORY:  No past surgical history on file.    CURRENT MEDICATIONS:    No current facility-administered medications for this encounter.     Current Outpatient Medications   Medication     cephALEXin (KEFLEX) 500 MG capsule     ondansetron (ZOFRAN ODT) 4 MG ODT tab     cetirizine (ZYRTEC) 10 MG tablet     diazepam (VALIUM) 5 MG tablet     gabapentin (NEURONTIN) 100 MG capsule     methocarbamol (ROBAXIN) 750 MG tablet     naproxen (NAPROSYN) 500 MG tablet     QUEtiapine (SEROQUEL) 50 MG tablet     selenium sulfide 2.25 % Sham     venlafaxine (EFFEXOR-XR) 150 MG 24 hr capsule     ALLERGIES:  Allergies   Allergen Reactions     Grass Extracts [Gramineae Pollens] Rash and Swelling     Swelling, itching, and no voice   Swelling, itching, and no voice        Latex Rash     Pollen Extracts [Pollen Extract] Rash     Swelling, itching, and no voice      VITALS:  Patient Vitals for the past 24 hrs:   BP Temp Temp src Pulse Resp SpO2 Weight   01/10/23 1123 (!) 124/90 97.9  F (36.6  C) Temporal 79 16 99 % 89.4 kg  (197 lb)     PHYSICAL EXAM    Constitutional:  Well developed, well nourished, no acute distress  EYES: Conjunctivae clear  HENT: On the right posterior scalp there is 1-1/2 cm tender nodule.  No erythema, fluctuance, or crepitus.  Slight crusting noted at the area.  No surrounding adenopathy.  Neck supple, no meningismus  Respiratory:  No respiratory distress, normal breath sounds  Cardiovascular:  Normal rate, normal rhythm, no murmurs  Integument: Warm, Dry, No erythema, No rash.   Neurologic:  Alert & oriented x 3          LAB:  All pertinent labs reviewed and interpreted.  Labs Ordered and Resulted from Time of ED Arrival to Time of ED Departure   BASIC METABOLIC PANEL - Abnormal       Result Value    Sodium 138      Potassium 4.3      Chloride 100      Carbon Dioxide (CO2) 28      Anion Gap 10      Urea Nitrogen 13.7      Creatinine 0.94      Calcium 10.2 (*)     Glucose 103 (*)     GFR Estimate 79     MAGNESIUM - Normal    Magnesium 2.2     HCG QUALITATIVE PREGNANCY - Normal    hCG Serum Qualitative Negative     CBC WITH PLATELETS AND DIFFERENTIAL    WBC Count 8.4      RBC Count 4.80      Hemoglobin 15.4      Hematocrit 45.2      MCV 94      MCH 32.1      MCHC 34.1      RDW 12.2      Platelet Count 346      % Neutrophils 60      % Lymphocytes 27      % Monocytes 8      % Eosinophils 4      % Basophils 1      % Immature Granulocytes 0      NRBCs per 100 WBC 0      Absolute Neutrophils 5.1      Absolute Lymphocytes 2.3      Absolute Monocytes 0.7      Absolute Eosinophils 0.3      Absolute Basophils 0.1      Absolute Immature Granulocytes 0.0      Absolute NRBCs 0.0         RADIOLOGY:  Reviewed all pertinent imaging. Please see official radiology report.  No orders to display     PROCEDURES:   None      Darshan TALLEY, am serving as a scribe to document services personally performed by Sean Mcghee CNP. based on my observation and the provider's statements to me. Sean TALLEY CNP attest that  Darshan Espinosa is acting in a scribe capacity, has observed my performance of the services and has documented them in accordance with my direction.    LUISA Santos, CNP  Emergency Services  [facility] EMERGENCY DEPARTMENT  [address] [place] [id number]  [phone]  Dept: [phone]           Sean Mcghee APRN CNP  01/10/23 1242

## 2023-01-28 ENCOUNTER — APPOINTMENT (OUTPATIENT)
Dept: RADIOLOGY | Facility: HOSPITAL | Age: 38
End: 2023-01-28
Attending: EMERGENCY MEDICINE
Payer: COMMERCIAL

## 2023-01-28 VITALS
OXYGEN SATURATION: 95 % | BODY MASS INDEX: 36.03 KG/M2 | DIASTOLIC BLOOD PRESSURE: 87 MMHG | WEIGHT: 197 LBS | RESPIRATION RATE: 18 BRPM | HEART RATE: 96 BPM | SYSTOLIC BLOOD PRESSURE: 137 MMHG | TEMPERATURE: 98.2 F

## 2023-01-28 PROCEDURE — 73130 X-RAY EXAM OF HAND: CPT | Mod: LT

## 2023-01-28 PROCEDURE — 99283 EMERGENCY DEPT VISIT LOW MDM: CPT

## 2023-01-28 RX ORDER — NAPROXEN 250 MG/1
500 TABLET ORAL ONCE
Status: COMPLETED | OUTPATIENT
Start: 2023-01-29 | End: 2023-01-29

## 2023-01-28 ASSESSMENT — ENCOUNTER SYMPTOMS: ARTHRALGIAS: 1

## 2023-01-29 ENCOUNTER — HOSPITAL ENCOUNTER (EMERGENCY)
Facility: HOSPITAL | Age: 38
Discharge: HOME | End: 2023-01-29
Attending: EMERGENCY MEDICINE | Admitting: EMERGENCY MEDICINE
Payer: COMMERCIAL

## 2023-01-29 DIAGNOSIS — M77.8 TENDONITIS OF WRIST, LEFT: ICD-10-CM

## 2023-01-29 PROCEDURE — 250N000011 HC RX IP 250 OP 636: Performed by: EMERGENCY MEDICINE

## 2023-01-29 PROCEDURE — 250N000013 HC RX MED GY IP 250 OP 250 PS 637: Performed by: EMERGENCY MEDICINE

## 2023-01-29 RX ADMIN — DEXAMETHASONE 10 MG: 2 TABLET ORAL at 00:15

## 2023-01-29 RX ADMIN — NAPROXEN 500 MG: 250 TABLET ORAL at 00:15

## 2023-01-29 NOTE — ED TRIAGE NOTES
The pt presents for evaluation of L thumb pain. She denies any injury. Pain began this morning. She thinks it is dislocated.

## 2023-01-29 NOTE — ED PROVIDER NOTES
EMERGENCY DEPARTMENT ENCOUNTER      NAME: Katelynn Cronin  AGE: 37 year old female  YOB: 1985  MRN: [id number]  EVALUATION DATE & TIME: No admission date for patient encounter.    PCP: Lokesh Logan    ED PROVIDER: Angélica Aguilar M.D.      Chief Complaint   Patient presents with     Thumb Discomfort         FINAL IMPRESSION:  1. Tendonitis of wrist, left        MEDICAL DECISION MAKIN:45 PM I met with the patient, obtained history, performed an initial exam, and discussed options and plan for diagnostics and treatment here in the ED.   Pertinent Labs & Imaging studies reviewed. (See chart for details)     Katelynn Cronin is a 37 year old female who presents with left wrist pain.  No trauma.  She is not on blood thinning medication.  She has no fever or infectious signs.  The left wrist appears entirely normal with tenderness over the dorsal aspect and into the thumb.  No deformity, no swelling or redness.  I will get an x-ray to evaluate for any bony abnormalities but I suspect tendinitis.  She has not tried any anti-inflammatory medication at home for her symptoms.  She will get naproxen here    X-rays are entirely normal.  I did consider diagnosis of gout but this does not fit the clinical picture.  I suspect tendinitis and feel a trial of NSAIDs and a wrist brace should improve symptoms.  If not I did place information for referral to [facility] for additional imaging and evaluation if needed.    At the conclusion of the encounter, we discussed warning signs and indications to return to the emergency department.  She understands these and all discharge instructions.     Medical Decision Making    History:    Supplemental history from: Documented in chart, if applicable    External Record(s) reviewed: Documented in chart, if applicable.    Work Up:    Chart documentation includes differential considered and any EKGs or imaging independently interpreted by provider, where  specified.    In additional to work up documented, I considered the following work up: Documented in chart, if applicable.    External consultation:    Discussion of management with another provider: N/A    Complicating factors:    Care impacted by chronic illness: N/A    Care affected by social determinants of health: N/A    Disposition considerations: Discharge. No recommendations on prescription strength medication(s). N/A.        MEDICATIONS GIVEN IN THE EMERGENCY:  Medications   naproxen (NAPROSYN) tablet 500 mg (500 mg Oral Given 1/29/23 0015)   dexamethasone (DECADRON) tablet 10 mg (10 mg Oral Given 1/29/23 0015)       =================================================================    HPI    Patient information was obtained from: Patient     Use of : N/A      Katelynn Cronin is a 37 year old female with no contributory history who presents with left thumb pain.    Patient reports persisting and worsening left wrist pain that began this morning that she described initially as wrist tightness. She states that the pain has since started to radiate into her left thumb and outwards into her left elbow. She believes that it is currently dislocated but denies recent injuries. Patient has not yet taken any medications to relieve pain. Patient denies additional symptoms or complaints at this time.     Social History: Works as a pharmacy technician    REVIEW OF SYSTEMS   Review of Systems   Musculoskeletal: Positive for arthralgias (left thumb).   All other systems reviewed and are negative.       PAST MEDICAL HISTORY:  Past Medical History:   Diagnosis Date     Acute midline low back pain without sciatica 2/10/2019    Formatting of this note might be different from the original. Slipped and fell directly onto her back on 2/4/2019. Was seen at [facility] ER on 2/4/2019 and lumbar spine x-rays showed an age indeterminate 20% compression of L2 (but patient was not found to have tenderness to palpation  over L2 on clinical exam in the ER), and lumbar spine, pelvis and left hip x-rays were otherwise unremarkable     Alcohol abuse 8/31/2018    Formatting of this note might be different from the original. [facility] ER visit on 6/29/2018 for panic attack with alcohol intoxication (blood alcohol level 0.125).  Hospitalization at [facility] 8/13-8/20/2018 for alcohol intoxication (MILTON 0.16) and acetaminophen overdose. Patient was voluntarily admitted to the inpatient psychiatric unit for observation and treatment following medi     Allergic rhinitis 5/7/2014    Formatting of this note might be different from the original. Her symptoms are worst during the Spring (?) Skin Prick Testing was performed on: 6/13/2014  Sensitive to: English Plantain & Molds     Amblyopia 7/8/2010    Formatting of this note might be different from the original. Right eye.     Anxiety 2/29/2012    Formatting of this note might be different from the original. Zoloft started 2/29/12.  Switched to Effexor XR 1/15/2014.     MACARIO III (cervical intraepithelial neoplasia grade III) with severe dysplasia 10/29/2013    Formatting of this note might be different from the original. 09/04/2012 ASCUSHPV+ 10/02/2012 [place] MACARIO I 09/10/2013 AGC/ASCUS/HPV+ 10/10/2013 [place] MACARIO II 10/29/2013 [facility] MACARIO II-III, Negative margins 01/29/2015 NIL/HPV negative 03/06/2015 NIL/HPV negative 05/17/2016 NIL/HPV negative 02/28/2019 NIL/HPV negative  ASCCP recommends:  History of CIN2 - CIN3:  Pap and HPV every 3 years for 20 years.   Pl     Dysthymic disorder 1/25/2013     Eczema 2/19/2015     Hypercholesterolemia 2/19/2015    Formatting of this note might be different from the original. Mildly elevated total and LDL cholesterol levels, along with a low HDL level, noted 2/19/2015.     Lateral epicondylitis of right elbow 8/28/2014     Moderate episode of recurrent major depressive disorder (H) 3/4/2019    Formatting of this note might be different from the original.  Zoloft started 2/29/12. Previously was on medication from [id number], and briefly again in 2009 for postpartum depression. Effexor XR started on 1/15/2014. She is also following with Psychology for counseling therapy.     Overweight 6/29/2009     PPH (postpartum hemorrhage) 3/13/2009     Vitamin D deficiency 6/13/2014    Formatting of this note might be different from the original. Treatment started 6/13/2014 with Vitamin D 5,000 units daily per Allergy specialist.     Vocal cord dysfunction 6/15/2014    Formatting of this note might be different from the original. Diagnosed by Allergy specialist 6/13/2014, and treatment with Speech Therapy offered if the patient wishes to pursue this.       PAST SURGICAL HISTORY:  No past surgical history on file.    CURRENT MEDICATIONS:    No current facility-administered medications for this encounter.    Current Outpatient Medications:      cetirizine (ZYRTEC) 10 MG tablet, [CETIRIZINE (ZYRTEC) 10 MG TABLET] Take 10 mg by mouth., Disp: , Rfl:      diazepam (VALIUM) 5 MG tablet, Take 1 tablet (5 mg) by mouth every 6 hours as needed for muscle spasms, Disp: 5 tablet, Rfl: 0     gabapentin (NEURONTIN) 100 MG capsule, [GABAPENTIN (NEURONTIN) 100 MG CAPSULE] Take 100 mg by mouth., Disp: , Rfl:      methocarbamol (ROBAXIN) 750 MG tablet, Take 1-2 tablets (750-1,500 mg) by mouth 4 times daily as needed for muscle spasms, Disp: 30 tablet, Rfl: 0     naproxen (NAPROSYN) 500 MG tablet, [NAPROXEN (NAPROSYN) 500 MG TABLET] Take 500 mg by mouth., Disp: , Rfl:      QUEtiapine (SEROQUEL) 50 MG tablet, [QUETIAPINE (SEROQUEL) 50 MG TABLET] Take 50 mg by mouth., Disp: , Rfl:      selenium sulfide 2.25 % Sham, [SELENIUM SULFIDE 2.25 % SHAM] Apply 6 mL topically daily., Disp: 180 mL, Rfl: 0     venlafaxine (EFFEXOR-XR) 150 MG 24 hr capsule, [VENLAFAXINE (EFFEXOR-XR) 150 MG 24 HR CAPSULE] TAKE 2 CAPSULES BY MOUTH EVERY DAY WITH A MEAL, Disp: , Rfl:     ALLERGIES:  Allergies   Allergen Reactions      Grass Extracts [Gramineae Pollens] Rash and Swelling     Swelling, itching, and no voice   Swelling, itching, and no voice        Latex Rash     Pollen Extracts [Pollen Extract] Rash     Swelling, itching, and no voice        FAMILY HISTORY:  History reviewed. No pertinent family history.    SOCIAL HISTORY:   Social History     Tobacco Use     Smoking status: Never     Smokeless tobacco: Never   Substance Use Topics     Alcohol use: Yes     Drug use: Not Currently        PHYSICAL EXAM:    Vitals: /87   Pulse 96   Temp 98.2  F (36.8  C) (Temporal)   Resp 18   Wt 89.4 kg (197 lb)   SpO2 95%   BMI 36.03 kg/m     General:. Alert and interactive, comfortable appearing.  HENT: Oropharynx without erythema or exudates. MMM.  TMs clear bilaterally.  Eyes: Pupils mid-sized and equally reactive.   Neck: Full AROM.  No midline tenderness to palpation.  Cardiovascular: Regular rate and rhythm. Peripheral pulses 2+ bilaterally.  Chest/Pulmonary: Normal work of breathing. Lung sounds clear and equal throughout, no wheezes or crackles. No chest wall tenderness or deformities.  Abdomen: Soft, nondistended. Nontender without guarding or rebound.  Back/Spine: No CVA or midline tenderness.  Extremities: Normal ROM of all major joints. No lower extremity edema. Tenderness over the left dorsal wrist and into the base of the left thumb, no obvious deformity, no swelling or redness, full ROM  Skin: Warm and dry. Normal skin color.   Neuro: Speech clear. CNs grossly intact. Moves all extremities appropriately. Strength and sensation grossly intact to all extremities.   Psych: Normal affect/mood, cooperative, memory appropriate.       RADIOLOGY:  XR Hand Left G/E 3 Views   Final Result   IMPRESSION: No radiographic evidence of acute fracture or malalignment. Joint spaces are normal. No radiopaque foreign bodies.            IYvon, am serving as a scribe to document services personally performed by Dr. Angélica Aguilar   based on my observation and the provider's statements to me. I, Angélica Aguilar MD attest that Yvon Christensen is acting in a scribe capacity, has observed my performance of the services and has documented them in accordance with my direction.      Angélica Aguilar M.D.  Emergency Medicine  [facility] EMERGENCY DEPARTMENT  [address] [place] [id number]  [phone]  Dept: [phone]         Angélica Aguilar MD  01/29/23 0240

## 2023-01-29 NOTE — DISCHARGE INSTRUCTIONS
Please use naproxen 500 mg every 12 hours.  Take with a small amount of food to avoid stomach upset.  You should continue using the naproxen until your pain has improved.  After your symptoms have improved, take for an additional 24 hours and then discontinue.    You may also use Tylenol 1000 mg every 8 hours on top of the naproxen.  Do this if you have inadequate pain control from the naproxen.    Wear the wrist brace during daytime hours to help reduce inflammation and restrict movement.    Follow-up with [facility] in 1 week if you are not getting better.

## 2023-02-11 ENCOUNTER — HEALTH MAINTENANCE LETTER (OUTPATIENT)
Age: 38
End: 2023-02-11

## 2023-04-02 ENCOUNTER — APPOINTMENT (OUTPATIENT)
Dept: CT IMAGING | Facility: HOSPITAL | Age: 38
End: 2023-04-02
Attending: EMERGENCY MEDICINE
Payer: COMMERCIAL

## 2023-04-02 ENCOUNTER — HOSPITAL ENCOUNTER (EMERGENCY)
Facility: HOSPITAL | Age: 38
Discharge: HOME | End: 2023-04-02
Admitting: EMERGENCY MEDICINE
Payer: COMMERCIAL

## 2023-04-02 ENCOUNTER — APPOINTMENT (OUTPATIENT)
Dept: RADIOLOGY | Facility: HOSPITAL | Age: 38
End: 2023-04-02
Attending: EMERGENCY MEDICINE
Payer: COMMERCIAL

## 2023-04-02 VITALS
RESPIRATION RATE: 16 BRPM | HEART RATE: 74 BPM | BODY MASS INDEX: 36.03 KG/M2 | TEMPERATURE: 98.1 F | DIASTOLIC BLOOD PRESSURE: 63 MMHG | OXYGEN SATURATION: 99 % | WEIGHT: 197 LBS | SYSTOLIC BLOOD PRESSURE: 119 MMHG
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
## 2023-04-02 DIAGNOSIS — S06.0XAA CONCUSSION: ICD-10-CM

## 2023-04-02 LAB
ANION GAP SERPL CALCULATED.3IONS-SCNC: 10 MMOL/L (ref 7–15)
BASOPHILS # BLD AUTO: 0.1 10E3/UL (ref 0–0.2)
BASOPHILS NFR BLD AUTO: 1 %
BUN SERPL-MCNC: 13.2 MG/DL (ref 6–20)
CALCIUM SERPL-MCNC: 9.3 MG/DL (ref 8.6–10)
CHLORIDE SERPL-SCNC: 100 MMOL/L (ref 98–107)
CREAT SERPL-MCNC: 0.76 MG/DL (ref 0.51–0.95)
DEPRECATED HCO3 PLAS-SCNC: 27 MMOL/L (ref 22–29)
EOSINOPHIL # BLD AUTO: 0.2 10E3/UL (ref 0–0.7)
EOSINOPHIL NFR BLD AUTO: 3 %
ERYTHROCYTE [DISTWIDTH] IN BLOOD BY AUTOMATED COUNT: 12 % (ref 10–15)
GFR SERPL CREATININE-BSD FRML MDRD: >90 ML/MIN/1.73M2
GLUCOSE SERPL-MCNC: 92 MG/DL (ref 70–99)
HCG SERPL QL: NEGATIVE
HCT VFR BLD AUTO: 41.6 % (ref 35–47)
HGB BLD-MCNC: 14.2 G/DL (ref 11.7–15.7)
IMM GRANULOCYTES # BLD: 0 10E3/UL
IMM GRANULOCYTES NFR BLD: 0 %
LYMPHOCYTES # BLD AUTO: 3 10E3/UL (ref 0.8–5.3)
LYMPHOCYTES NFR BLD AUTO: 34 %
MCH RBC QN AUTO: 32.1 PG (ref 26.5–33)
MCHC RBC AUTO-ENTMCNC: 34.1 G/DL (ref 31.5–36.5)
MCV RBC AUTO: 94 FL (ref 78–100)
MONOCYTES # BLD AUTO: 0.6 10E3/UL (ref 0–1.3)
MONOCYTES NFR BLD AUTO: 6 %
NEUTROPHILS # BLD AUTO: 5.1 10E3/UL (ref 1.6–8.3)
NEUTROPHILS NFR BLD AUTO: 56 %
NRBC # BLD AUTO: 0 10E3/UL
NRBC BLD AUTO-RTO: 0 /100
PLATELET # BLD AUTO: 407 10E3/UL (ref 150–450)
POTASSIUM SERPL-SCNC: 3.7 MMOL/L (ref 3.4–5.3)
RBC # BLD AUTO: 4.43 10E6/UL (ref 3.8–5.2)
SODIUM SERPL-SCNC: 137 MMOL/L (ref 136–145)
TROPONIN T SERPL HS-MCNC: <6 NG/L
WBC # BLD AUTO: 9 10E3/UL (ref 4–11)

## 2023-04-02 PROCEDURE — 96361 HYDRATE IV INFUSION ADD-ON: CPT

## 2023-04-02 PROCEDURE — 84703 CHORIONIC GONADOTROPIN ASSAY: CPT | Performed by: EMERGENCY MEDICINE

## 2023-04-02 PROCEDURE — 82435 ASSAY OF BLOOD CHLORIDE: CPT | Performed by: EMERGENCY MEDICINE

## 2023-04-02 PROCEDURE — 85025 COMPLETE CBC W/AUTO DIFF WBC: CPT | Performed by: EMERGENCY MEDICINE

## 2023-04-02 PROCEDURE — 250N000011 HC RX IP 250 OP 636: Performed by: EMERGENCY MEDICINE

## 2023-04-02 PROCEDURE — 258N000003 HC RX IP 258 OP 636: Performed by: EMERGENCY MEDICINE

## 2023-04-02 PROCEDURE — 72125 CT NECK SPINE W/O DYE: CPT

## 2023-04-02 PROCEDURE — 99285 EMERGENCY DEPT VISIT HI MDM: CPT | Mod: 25

## 2023-04-02 PROCEDURE — 93005 ELECTROCARDIOGRAM TRACING: CPT | Performed by: EMERGENCY MEDICINE

## 2023-04-02 PROCEDURE — 74174 CTA ABD&PLVS W/CONTRAST: CPT

## 2023-04-02 PROCEDURE — 96374 THER/PROPH/DIAG INJ IV PUSH: CPT | Mod: 59

## 2023-04-02 PROCEDURE — 73030 X-RAY EXAM OF SHOULDER: CPT | Mod: LT

## 2023-04-02 PROCEDURE — 36415 COLL VENOUS BLD VENIPUNCTURE: CPT | Performed by: EMERGENCY MEDICINE

## 2023-04-02 PROCEDURE — 70450 CT HEAD/BRAIN W/O DYE: CPT

## 2023-04-02 PROCEDURE — 84484 ASSAY OF TROPONIN QUANT: CPT | Performed by: EMERGENCY MEDICINE

## 2023-04-02 RX ORDER — IOPAMIDOL 755 MG/ML
90 INJECTION, SOLUTION INTRAVASCULAR ONCE
Status: COMPLETED | OUTPATIENT
Start: 2023-04-02 | End: 2023-04-02

## 2023-04-02 RX ORDER — ONDANSETRON 2 MG/ML
4 INJECTION INTRAMUSCULAR; INTRAVENOUS ONCE
Status: COMPLETED | OUTPATIENT
Start: 2023-04-02 | End: 2023-04-02

## 2023-04-02 RX ADMIN — SODIUM CHLORIDE 1000 ML: 9 INJECTION, SOLUTION INTRAVENOUS at 13:42

## 2023-04-02 RX ADMIN — IOPAMIDOL 90 ML: 755 INJECTION, SOLUTION INTRAVENOUS at 15:45

## 2023-04-02 RX ADMIN — ONDANSETRON 4 MG: 2 INJECTION INTRAMUSCULAR; INTRAVENOUS at 13:42

## 2023-04-02 ASSESSMENT — ENCOUNTER SYMPTOMS
BACK PAIN: 0
WEAKNESS: 0
NAUSEA: 0
NECK PAIN: 0
VOMITING: 0
LIGHT-HEADEDNESS: 1
ABDOMINAL PAIN: 0
SHORTNESS OF BREATH: 0
NUMBNESS: 0
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
## 2023-04-03 LAB
ATRIAL RATE - MUSE: 81 BPM
DIASTOLIC BLOOD PRESSURE - MUSE: NORMAL MMHG
INTERPRETATION ECG - MUSE: NORMAL
P AXIS - MUSE: 55 DEGREES
PR INTERVAL - MUSE: 152 MS
QRS DURATION - MUSE: 88 MS
QT - MUSE: 386 MS
QTC - MUSE: 448 MS
R AXIS - MUSE: -39 DEGREES
SYSTOLIC BLOOD PRESSURE - MUSE: NORMAL MMHG
T AXIS - MUSE: 44 DEGREES
VENTRICULAR RATE- MUSE: 81 BPM

## 2023-04-16 ENCOUNTER — HOSPITAL ENCOUNTER (EMERGENCY)
Facility: HOSPITAL | Age: 38
Discharge: HOME | End: 2023-04-17
Attending: EMERGENCY MEDICINE | Admitting: EMERGENCY MEDICINE
Payer: COMMERCIAL

## 2023-04-16 DIAGNOSIS — M54.2 NECK DISCOMFORT: ICD-10-CM

## 2023-04-16 DIAGNOSIS — F41.9 ANXIETY: ICD-10-CM

## 2023-04-16 LAB
ANION GAP SERPL CALCULATED.3IONS-SCNC: 12 MMOL/L (ref 7–15)
BASOPHILS # BLD AUTO: 0.1 10E3/UL (ref 0–0.2)
BASOPHILS NFR BLD AUTO: 1 %
BUN SERPL-MCNC: 15.4 MG/DL (ref 6–20)
CALCIUM SERPL-MCNC: 9.7 MG/DL (ref 8.6–10)
CHLORIDE SERPL-SCNC: 98 MMOL/L (ref 98–107)
CREAT SERPL-MCNC: 0.85 MG/DL (ref 0.51–0.95)
DEPRECATED HCO3 PLAS-SCNC: 27 MMOL/L (ref 22–29)
EOSINOPHIL # BLD AUTO: 0.4 10E3/UL (ref 0–0.7)
EOSINOPHIL NFR BLD AUTO: 3 %
ERYTHROCYTE [DISTWIDTH] IN BLOOD BY AUTOMATED COUNT: 11.9 % (ref 10–15)
GFR SERPL CREATININE-BSD FRML MDRD: 89 ML/MIN/1.73M2
GLUCOSE SERPL-MCNC: 97 MG/DL (ref 70–99)
HCG INTACT+B SERPL-ACNC: <1 MIU/ML
HCT VFR BLD AUTO: 43.8 % (ref 35–47)
HGB BLD-MCNC: 15.2 G/DL (ref 11.7–15.7)
IMM GRANULOCYTES # BLD: 0.1 10E3/UL
IMM GRANULOCYTES NFR BLD: 1 %
LYMPHOCYTES # BLD AUTO: 4.1 10E3/UL (ref 0.8–5.3)
LYMPHOCYTES NFR BLD AUTO: 32 %
MCH RBC QN AUTO: 32.1 PG (ref 26.5–33)
MCHC RBC AUTO-ENTMCNC: 34.7 G/DL (ref 31.5–36.5)
MCV RBC AUTO: 92 FL (ref 78–100)
MONOCYTES # BLD AUTO: 0.9 10E3/UL (ref 0–1.3)
MONOCYTES NFR BLD AUTO: 7 %
NEUTROPHILS # BLD AUTO: 7.4 10E3/UL (ref 1.6–8.3)
NEUTROPHILS NFR BLD AUTO: 56 %
NRBC # BLD AUTO: 0 10E3/UL
NRBC BLD AUTO-RTO: 0 /100
PLATELET # BLD AUTO: 405 10E3/UL (ref 150–450)
POTASSIUM SERPL-SCNC: 3.8 MMOL/L (ref 3.4–5.3)
RBC # BLD AUTO: 4.74 10E6/UL (ref 3.8–5.2)
SODIUM SERPL-SCNC: 137 MMOL/L (ref 136–145)
WBC # BLD AUTO: 12.8 10E3/UL (ref 4–11)

## 2023-04-16 PROCEDURE — 84484 ASSAY OF TROPONIN QUANT: CPT | Performed by: EMERGENCY MEDICINE

## 2023-04-16 PROCEDURE — 250N000011 HC RX IP 250 OP 636: Performed by: EMERGENCY MEDICINE

## 2023-04-16 PROCEDURE — 84702 CHORIONIC GONADOTROPIN TEST: CPT | Performed by: EMERGENCY MEDICINE

## 2023-04-16 PROCEDURE — 85025 COMPLETE CBC W/AUTO DIFF WBC: CPT | Performed by: EMERGENCY MEDICINE

## 2023-04-16 PROCEDURE — 96374 THER/PROPH/DIAG INJ IV PUSH: CPT | Mod: 59

## 2023-04-16 PROCEDURE — 80048 BASIC METABOLIC PNL TOTAL CA: CPT | Performed by: EMERGENCY MEDICINE

## 2023-04-16 PROCEDURE — 99285 EMERGENCY DEPT VISIT HI MDM: CPT | Mod: 25

## 2023-04-16 PROCEDURE — 36415 COLL VENOUS BLD VENIPUNCTURE: CPT | Performed by: EMERGENCY MEDICINE

## 2023-04-16 RX ORDER — HYDROMORPHONE HYDROCHLORIDE 1 MG/ML
0.5 INJECTION, SOLUTION INTRAMUSCULAR; INTRAVENOUS; SUBCUTANEOUS ONCE
Status: COMPLETED | OUTPATIENT
Start: 2023-04-16 | End: 2023-04-16

## 2023-04-16 RX ADMIN — HYDROMORPHONE HYDROCHLORIDE 0.5 MG: 1 INJECTION, SOLUTION INTRAMUSCULAR; INTRAVENOUS; SUBCUTANEOUS at 23:00

## 2023-04-16 ASSESSMENT — ENCOUNTER SYMPTOMS
NECK PAIN: 1
TROUBLE SWALLOWING: 1
SORE THROAT: 0

## 2023-04-16 ASSESSMENT — ACTIVITIES OF DAILY LIVING (ADL): ADLS_ACUITY_SCORE: 35

## 2023-04-16 NOTE — LETTER
Katelynn Cronin was seen and treated in our emergency department on 4/16/2023.  She may return to work on 04/19/2023.       If you have any questions or concerns, please don't hesitate to call.      Weston Martínez, DO

## 2023-04-17 ENCOUNTER — APPOINTMENT (OUTPATIENT)
Dept: CT IMAGING | Facility: HOSPITAL | Age: 38
End: 2023-04-17
Attending: EMERGENCY MEDICINE
Payer: COMMERCIAL

## 2023-04-17 ENCOUNTER — APPOINTMENT (OUTPATIENT)
Dept: MRI IMAGING | Facility: HOSPITAL | Age: 38
End: 2023-04-17
Attending: PSYCHIATRY & NEUROLOGY
Payer: COMMERCIAL

## 2023-04-17 VITALS
HEIGHT: 62 IN | DIASTOLIC BLOOD PRESSURE: 55 MMHG | OXYGEN SATURATION: 92 % | SYSTOLIC BLOOD PRESSURE: 104 MMHG | TEMPERATURE: 98.4 F | HEART RATE: 86 BPM | BODY MASS INDEX: 36.03 KG/M2 | RESPIRATION RATE: 15 BRPM

## 2023-04-17 LAB
APTT PPP: 29 SECONDS (ref 22–38)
ATRIAL RATE - MUSE: 102 BPM
DIASTOLIC BLOOD PRESSURE - MUSE: NORMAL MMHG
GLUCOSE BLDC GLUCOMTR-MCNC: 104 MG/DL (ref 70–99)
HOLD SPECIMEN: NORMAL
INR PPP: 0.99 (ref 0.85–1.15)
INTERPRETATION ECG - MUSE: NORMAL
P AXIS - MUSE: 44 DEGREES
PR INTERVAL - MUSE: 180 MS
QRS DURATION - MUSE: 96 MS
QT - MUSE: 356 MS
QTC - MUSE: 463 MS
R AXIS - MUSE: -47 DEGREES
SYSTOLIC BLOOD PRESSURE - MUSE: NORMAL MMHG
T AXIS - MUSE: 47 DEGREES
TROPONIN T SERPL HS-MCNC: <6 NG/L
VENTRICULAR RATE- MUSE: 102 BPM

## 2023-04-17 PROCEDURE — 250N000011 HC RX IP 250 OP 636: Performed by: EMERGENCY MEDICINE

## 2023-04-17 PROCEDURE — 70496 CT ANGIOGRAPHY HEAD: CPT

## 2023-04-17 PROCEDURE — 82962 GLUCOSE BLOOD TEST: CPT

## 2023-04-17 PROCEDURE — 93005 ELECTROCARDIOGRAM TRACING: CPT | Performed by: EMERGENCY MEDICINE

## 2023-04-17 PROCEDURE — 70551 MRI BRAIN STEM W/O DYE: CPT

## 2023-04-17 PROCEDURE — 70498 CT ANGIOGRAPHY NECK: CPT

## 2023-04-17 PROCEDURE — 85610 PROTHROMBIN TIME: CPT | Performed by: EMERGENCY MEDICINE

## 2023-04-17 PROCEDURE — 36415 COLL VENOUS BLD VENIPUNCTURE: CPT | Performed by: EMERGENCY MEDICINE

## 2023-04-17 PROCEDURE — 85730 THROMBOPLASTIN TIME PARTIAL: CPT | Performed by: EMERGENCY MEDICINE

## 2023-04-17 RX ORDER — HYDROXYZINE PAMOATE 50 MG/1
50 CAPSULE ORAL 3 TIMES DAILY PRN
Qty: 20 CAPSULE | Refills: 0 | Status: SHIPPED | OUTPATIENT
Start: 2023-04-17

## 2023-04-17 RX ORDER — IOPAMIDOL 755 MG/ML
80 INJECTION, SOLUTION INTRAVASCULAR ONCE
Status: COMPLETED | OUTPATIENT
Start: 2023-04-17 | End: 2023-04-17

## 2023-04-17 RX ADMIN — IOPAMIDOL 80 ML: 755 INJECTION, SOLUTION INTRAVENOUS at 00:20

## 2023-04-17 ASSESSMENT — ACTIVITIES OF DAILY LIVING (ADL)
ADLS_ACUITY_SCORE: 35
ADLS_ACUITY_SCORE: 35

## 2023-04-17 NOTE — ED TRIAGE NOTES
Patient presents to ED for jaw pain.  History of dislocated jaw last year.  States she was sitting in bed and moved her jaw in a way that she believes dislocated it again.       Triage Assessment     Row Name 04/16/23 1452       Triage Assessment (Adult)    Airway WDL WDL       Respiratory WDL    Respiratory WDL WDL       Skin Circulation/Temperature WDL    Skin Circulation/Temperature WDL WDL       Cardiac WDL    Cardiac WDL WDL       Peripheral/Neurovascular WDL    Peripheral Neurovascular WDL WDL       Cognitive/Neuro/Behavioral WDL    Cognitive/Neuro/Behavioral WDL WDL

## 2023-04-17 NOTE — CONSULTS
"[facility]    Stroke Telephone Note    I was called by Weston Martínez on 04/17/23 regarding patient Katelynn Cronin. The patient is a 37 year old female presents to ED with complains of jaw dislocation. Initially pt complained of pain in her mandible as well. Later on nurse exam pt reported that she can not open her L eye , reports L arm numbness and weakness. Per Ed provider examination pt keeps her L eye shut (even when attempting to open it), pt is able to lift arm up with intermittent jerky movements.       Stroke Code Data (for stroke code without tele)  Stroke code activated 04/17/23   0002   Stroke provider first response  04/17/23   0004    04/17/23   0004   Last known normal 04/16/23 2357        Time of discovery   (or onset of symptoms) 04/16/23 2357   Head CT read by Stroke Neuro Dr/Provider 04/17/23   0017   Was stroke code de-escalated? Yes 04/17/23 0019          Imaging Findings   Ct head neg for acute abnormalities  CTA neg for LVO , stenosis or other vascular abnormalities      Intravenous Thrombolysis  Not given due to:   - stroke mimic: etiology other than stroke could include functional or conversion disorder    Endovascular Treatment  Not initiated due to absence of proximal vessel occlusion    Impression  L sided numbness and weakness    Recommendations   MRI brain without contrast    My recommendations are based on the information provided over the phone by Katelynn Cronin's in-person providers. They are not intended to replace the clinical judgment of her in-person providers. I was not requested to personally see or examine the patient at this time.    Juan Jose Obregon MD  Vascular Neurology    To page me or covering stroke neurology team member, click here: [email or web address]  Choose \"On Call\" tab at top, then select \"NEUROLOGY/ALL SITES\" from middle drop-down box, press Enter, then look for \"stroke\" or \"telestroke\" for your site.           "

## 2023-04-17 NOTE — DISCHARGE INSTRUCTIONS
CT scanning of the head and neck as well as brain MRI all appear reassuring.    You can use over-the-counter ibuprofen as needed for any further neck discomfort/pain.  Use the prescribed hydroxyzine as needed for any further episodes of anxiety.    Please follow-up with your primary care provider for reevaluation.  Return back to ED sooner for any worsening anxiety, pain, or any other new or concerning symptoms.

## 2023-04-17 NOTE — ED NOTES
Writer went to pt's room to get her ready for CT at 2355. Pt c/o left side heaviness. Noticed pt has right facial droop, unable to lift her left arm and left leg. Notify Dr. Martínez at 2400 and Dr. Martínez saw pt and Tier 1 stroke code was called at 0005.   Statement Selected

## 2023-04-17 NOTE — ED NOTES
Noted pt's smile symmetrical, able to move both eyebrows up and down and able to open her left eye, able to lift both hands without drift, able to lift left and right leg with no drift.Pt alert and oriented. Pass swallow study.

## 2023-06-15 ENCOUNTER — HOSPITAL ENCOUNTER (EMERGENCY)
Facility: HOSPITAL | Age: 38
Discharge: HOME | End: 2023-06-15
Attending: EMERGENCY MEDICINE | Admitting: EMERGENCY MEDICINE
Payer: COMMERCIAL

## 2023-06-15 VITALS
HEIGHT: 62 IN | WEIGHT: 210 LBS | BODY MASS INDEX: 38.64 KG/M2 | OXYGEN SATURATION: 98 % | RESPIRATION RATE: 16 BRPM | DIASTOLIC BLOOD PRESSURE: 69 MMHG | HEART RATE: 76 BPM | TEMPERATURE: 98.2 F | SYSTOLIC BLOOD PRESSURE: 104 MMHG

## 2023-06-15 DIAGNOSIS — H81.399 PERIPHERAL VERTIGO, UNSPECIFIED LATERALITY: ICD-10-CM

## 2023-06-15 LAB
ANION GAP SERPL CALCULATED.3IONS-SCNC: 10 MMOL/L (ref 7–15)
BASOPHILS # BLD AUTO: 0.1 10E3/UL (ref 0–0.2)
BASOPHILS NFR BLD AUTO: 1 %
BUN SERPL-MCNC: 14.5 MG/DL (ref 6–20)
CALCIUM SERPL-MCNC: 9.7 MG/DL (ref 8.6–10)
CHLORIDE SERPL-SCNC: 105 MMOL/L (ref 98–107)
CREAT SERPL-MCNC: 0.78 MG/DL (ref 0.51–0.95)
DEPRECATED HCO3 PLAS-SCNC: 25 MMOL/L (ref 22–29)
EOSINOPHIL # BLD AUTO: 0.3 10E3/UL (ref 0–0.7)
EOSINOPHIL NFR BLD AUTO: 3 %
ERYTHROCYTE [DISTWIDTH] IN BLOOD BY AUTOMATED COUNT: 12.1 % (ref 10–15)
GFR SERPL CREATININE-BSD FRML MDRD: >90 ML/MIN/1.73M2
GLUCOSE SERPL-MCNC: 104 MG/DL (ref 70–99)
HCT VFR BLD AUTO: 41.2 % (ref 35–47)
HGB BLD-MCNC: 14.1 G/DL (ref 11.7–15.7)
HOLD SPECIMEN: NORMAL
IMM GRANULOCYTES # BLD: 0 10E3/UL
IMM GRANULOCYTES NFR BLD: 1 %
LYMPHOCYTES # BLD AUTO: 2.5 10E3/UL (ref 0.8–5.3)
LYMPHOCYTES NFR BLD AUTO: 29 %
MCH RBC QN AUTO: 31.5 PG (ref 26.5–33)
MCHC RBC AUTO-ENTMCNC: 34.2 G/DL (ref 31.5–36.5)
MCV RBC AUTO: 92 FL (ref 78–100)
MONOCYTES # BLD AUTO: 0.6 10E3/UL (ref 0–1.3)
MONOCYTES NFR BLD AUTO: 7 %
NEUTROPHILS # BLD AUTO: 5.2 10E3/UL (ref 1.6–8.3)
NEUTROPHILS NFR BLD AUTO: 59 %
NRBC # BLD AUTO: 0 10E3/UL
NRBC BLD AUTO-RTO: 0 /100
PLATELET # BLD AUTO: 381 10E3/UL (ref 150–450)
POTASSIUM SERPL-SCNC: 4.1 MMOL/L (ref 3.4–5.3)
RBC # BLD AUTO: 4.48 10E6/UL (ref 3.8–5.2)
SODIUM SERPL-SCNC: 140 MMOL/L (ref 136–145)
TROPONIN T SERPL HS-MCNC: <6 NG/L
WBC # BLD AUTO: 8.5 10E3/UL (ref 4–11)

## 2023-06-15 PROCEDURE — 250N000013 HC RX MED GY IP 250 OP 250 PS 637: Performed by: EMERGENCY MEDICINE

## 2023-06-15 PROCEDURE — 93005 ELECTROCARDIOGRAM TRACING: CPT | Performed by: EMERGENCY MEDICINE

## 2023-06-15 PROCEDURE — 36415 COLL VENOUS BLD VENIPUNCTURE: CPT | Performed by: STUDENT IN AN ORGANIZED HEALTH CARE EDUCATION/TRAINING PROGRAM

## 2023-06-15 PROCEDURE — 80048 BASIC METABOLIC PNL TOTAL CA: CPT | Performed by: EMERGENCY MEDICINE

## 2023-06-15 PROCEDURE — 85025 COMPLETE CBC W/AUTO DIFF WBC: CPT | Performed by: STUDENT IN AN ORGANIZED HEALTH CARE EDUCATION/TRAINING PROGRAM

## 2023-06-15 PROCEDURE — 85025 COMPLETE CBC W/AUTO DIFF WBC: CPT | Performed by: EMERGENCY MEDICINE

## 2023-06-15 PROCEDURE — 84484 ASSAY OF TROPONIN QUANT: CPT | Performed by: EMERGENCY MEDICINE

## 2023-06-15 PROCEDURE — 99284 EMERGENCY DEPT VISIT MOD MDM: CPT

## 2023-06-15 RX ORDER — MECLIZINE HCL 12.5 MG 12.5 MG/1
25 TABLET ORAL ONCE
Status: COMPLETED | OUTPATIENT
Start: 2023-06-15 | End: 2023-06-15

## 2023-06-15 RX ORDER — MECLIZINE HYDROCHLORIDE 25 MG/1
25 TABLET ORAL 3 TIMES DAILY PRN
Qty: 20 TABLET | Refills: 0 | Status: SHIPPED | OUTPATIENT
Start: 2023-06-15

## 2023-06-15 RX ADMIN — MECLIZINE 25 MG: 12.5 TABLET ORAL at 12:43

## 2023-06-15 ASSESSMENT — ENCOUNTER SYMPTOMS
NAUSEA: 0
SHORTNESS OF BREATH: 0
DIZZINESS: 1
VOMITING: 0
HEADACHES: 1

## 2023-06-15 ASSESSMENT — ACTIVITIES OF DAILY LIVING (ADL): ADLS_ACUITY_SCORE: 35

## 2023-06-15 NOTE — ED PROVIDER NOTES
EMERGENCY DEPARTMENT ENCOUNTER      NAME: Katelynn Cronin  AGE: 37 year old female  YOB: 1985  MRN: [id number]  EVALUATION DATE & TIME: 6/15/2023 12:43 PM    PCP: Lokesh Logan    ED PROVIDER: Weston Martínez DO      Chief Complaint   Patient presents with     Dizziness         FINAL IMPRESSION:  1. Peripheral vertigo, unspecified laterality          ED COURSE & MEDICAL DECISION MAKIN-year-old female with history of vertigo presented to the ED for evaluation of room spinning dizziness that started immediately upon waking up sleep yesterday morning.  The patient states that the dizziness worsened again after sitting up in bed earlier this morning.  She reported a mild headache yesterday but states that this is since resolved.  The patient has no other complaints.  Upon arrival to the ED the patient was hemodynamically stable.  The patient did not appear to be in any obvious distress or discomfort at the time of initial evaluation.  On exam the patient was noted to have horizontal nystagmus to the left.  No other focal neurologic or cerebellar deficits noted.  Patient was given oral meclizine to treat her symptoms following her initial evaluation.    An EKG was obtained prior to my initial evaluation.  EKG revealed normal sinus rhythm with nonspecific ST segment changes.      CBC, BMP, and troponin were all reassuring.    The patient was reevaluated after receiving the oral meclizine.  The patient stated that her vertigo symptoms had improved but not completely resolved at the time reevaluation.  Regardless, the patient stated that she felt comfortable returning home.  The patient was educated about the likelihood of BPPV and reassured.  The patient was sent home with additional meclizine to use as needed.  The patient was instructed to follow-up with her primary care provider for reevaluation or to return back to ED sooner for any worsening vertigo, vomiting, or any other new or concerning  symptoms.    Pertinent Labs & Imaging studies reviewed. (See chart for details)  1:04 PM I met with the patient to gather history and to perform my initial exam. We discussed plans for the ED course, including diagnostic testing and treatment.      At the conclusion of the encounter I discussed the results of all of the tests and the disposition. The questions were answered. The patient or family acknowledged understanding and was agreeable with the care plan.     Medical Decision Making    History:    Supplemental history from: Documented in chart, if applicable    External Record(s) reviewed: Documented in chart, if applicable.    Work Up:    Chart documentation includes differential considered and any EKGs or imaging independently interpreted by provider, where specified.    In additional to work up documented, I considered the following work up: Documented in chart, if applicable.    External consultation:    Discussion of management with another provider: Documented in chart, if applicable    Complicating factors:    Care impacted by chronic illness: N/A    Care affected by social determinants of health: N/A    Disposition considerations: Discharge. I prescribed additional prescription strength medication(s) as charted. N/A.      PPE worn: n95 mask, goggles    MEDICATIONS GIVEN IN THE EMERGENCY:  Medications   meclizine (ANTIVERT) tablet 25 mg (25 mg Oral $Given 6/15/23 1243)       NEW PRESCRIPTIONS STARTED AT TODAY'S ER VISIT  Discharge Medication List as of 6/15/2023  2:18 PM      START taking these medications    Details   meclizine (ANTIVERT) 25 MG tablet Take 1 tablet (25 mg) by mouth 3 times daily as needed for dizziness, Disp-20 tablet, R-0, E-Prescribe                =================================================================    HPI    Patient information was obtained from: Patient    Use of : N/A        Katelynn Cronin is a 37 year old female with a pertinent history of MACARIO III,  amblyopia, episodic mood disorder, alcohol abuse, anxiety who presents to this ED ambulatory for evaluation of room-spinning dizziness.    The patient reports she woke up with persistent room-spinning dizziness and headache yesterday, noting that symptoms are alleviated when laying down and provoked by sitting up. This morning the patient woke up with continued symptoms, hence she came into the ED today. She denies any nausea vomiting, shortness of breath, or chest pain but endorses unsteady walk. She denies taking any medications for the symptoms.       REVIEW OF SYSTEMS   Review of Systems   Respiratory: Negative for shortness of breath.    Cardiovascular: Negative for chest pain.   Gastrointestinal: Negative for nausea and vomiting.   Neurological: Positive for dizziness and headaches.   All other systems reviewed and are negative.       PAST MEDICAL HISTORY:  Past Medical History:   Diagnosis Date     Acute midline low back pain without sciatica 2/10/2019    Formatting of this note might be different from the original. Slipped and fell directly onto her back on 2/4/2019. Was seen at [facility] ER on 2/4/2019 and lumbar spine x-rays showed an age indeterminate 20% compression of L2 (but patient was not found to have tenderness to palpation over L2 on clinical exam in the ER), and lumbar spine, pelvis and left hip x-rays were otherwise unremarkable     Alcohol abuse 8/31/2018    Formatting of this note might be different from the original. [facility] ER visit on 6/29/2018 for panic attack with alcohol intoxication (blood alcohol level 0.125).  Hospitalization at [facility] 8/13-8/20/2018 for alcohol intoxication (MILTON 0.16) and acetaminophen overdose. Patient was voluntarily admitted to the inpatient psychiatric unit for observation and treatment following medi     Allergic rhinitis 5/7/2014    Formatting of this note might be different from the original. Her symptoms are worst during the Spring  (?) Skin Prick Testing was performed on: 6/13/2014  Sensitive to: English Plantain & Molds     Amblyopia 7/8/2010    Formatting of this note might be different from the original. Right eye.     Anxiety 2/29/2012    Formatting of this note might be different from the original. Zoloft started 2/29/12.  Switched to Effexor XR 1/15/2014.     MACARIO III (cervical intraepithelial neoplasia grade III) with severe dysplasia 10/29/2013    Formatting of this note might be different from the original. 09/04/2012 ASCUSHPV+ 10/02/2012 [facility] MACARIO I 09/10/2013 [facility]/ASCUS/HPV+ 10/10/2013 [facility] MACARIO II 10/29/2013 [facility] MACARIO II-III, Negative margins 01/29/2015 NIL/HPV negative 03/06/2015 NIL/HPV negative 05/17/2016 NIL/HPV negative 02/28/2019 NIL/HPV negative  ASCCP recommends:  History of CIN2 - CIN3:  Pap and HPV every 3 years for 20 years.   Pl     Dysthymic disorder 1/25/2013     Eczema 2/19/2015     Hypercholesterolemia 2/19/2015    Formatting of this note might be different from the original. Mildly elevated total and LDL cholesterol levels, along with a low HDL level, noted 2/19/2015.     Lateral epicondylitis of right elbow 8/28/2014     Moderate episode of recurrent major depressive disorder (H) 3/4/2019    Formatting of this note might be different from the original. Zoloft started 2/29/12. Previously was on medication from 6776-2276, and briefly again in 2009 for postpartum depression. Effexor XR started on 1/15/2014. She is also following with Psychology for counseling therapy.     Overweight 6/29/2009     PPH (postpartum hemorrhage) 3/13/2009     Vitamin D deficiency 6/13/2014    Formatting of this note might be different from the original. Treatment started 6/13/2014 with Vitamin D 5,000 units daily per Allergy specialist.     Vocal cord dysfunction 6/15/2014    Formatting of this note might be different from the original. Diagnosed by Allergy specialist 6/13/2014, and treatment with Speech Therapy offered if the patient wishes  "to pursue this.       PAST SURGICAL HISTORY:  Past Surgical History:   Procedure Laterality Date     COLPOSCOPY CERVIX, LOOP ELECTRODE BIOPSY, COMBINED       wisdom teeth Bilateral            CURRENT MEDICATIONS:    meclizine (ANTIVERT) 25 MG tablet  cetirizine (ZYRTEC) 10 MG tablet  diazepam (VALIUM) 5 MG tablet  gabapentin (NEURONTIN) 100 MG capsule  hydrOXYzine (VISTARIL) 50 MG capsule  methocarbamol (ROBAXIN) 750 MG tablet  naproxen (NAPROSYN) 500 MG tablet  QUEtiapine (SEROQUEL) 50 MG tablet  selenium sulfide 2.25 % Sham  venlafaxine (EFFEXOR-XR) 150 MG 24 hr capsule        ALLERGIES:  Allergies   Allergen Reactions     Grass Extracts [Gramineae Pollens] Rash and Swelling     Swelling, itching, and no voice   Swelling, itching, and no voice        Latex Rash     Pollen Extracts [Pollen Extract] Rash     Swelling, itching, and no voice        FAMILY HISTORY:  No family history on file.    SOCIAL HISTORY:   Social History     Socioeconomic History     Marital status: Single   Tobacco Use     Smoking status: Never     Smokeless tobacco: Never   Substance and Sexual Activity     Alcohol use: Yes     Drug use: Not Currently     Sexual activity: Not Currently       VITALS:  /69   Pulse 76   Temp 98.2  F (36.8  C) (Oral)   Resp 16   Ht 1.575 m (5' 2\")   Wt 95.3 kg (210 lb)   SpO2 98%   BMI 38.41 kg/m      PHYSICAL EXAM    General presentation: Alert, Vital signs reviewed. NAD  HENT: ENT inspection is normal. Oropharynx is moist and clear.   Eye: Pupils are equal and reactive to light. EOMI. horizontal nystagmus to the left.  Neck: The neck is supple, with full ROM, with no evidence of meningismus.  Pulmonary: Currently in no acute respiratory distress. Normal, non labored respirations, the lung sounds are normal with good equal air movement. Clear to auscultation bilaterally.   Circulatory: Regular rate and rhythm. Peripheral pulses are strong and equal. No murmurs, rubs, or gallops.   Abdominal: The " abdomen is soft. Nontender. No rigidity, guarding, or rebound. Bowel sounds normal.   Neurologic: Alert, oriented to person, place, and time. No motor deficit. No sensory deficit. Cranial nerves II through XII are intact.  Finger-nose testing is normal bilaterally.  Musculoskeletal: No extremity tenderness. Full range of motion in all extremities. No extremity edema.   Skin: Skin color is normal. No rash. Warm. Dry to touch.      LAB:  All pertinent labs reviewed and interpreted.  Results for orders placed or performed during the hospital encounter of 06/15/23   Basic metabolic panel   Result Value Ref Range    Sodium 140 136 - 145 mmol/L    Potassium 4.1 3.4 - 5.3 mmol/L    Chloride 105 98 - 107 mmol/L    Carbon Dioxide (CO2) 25 22 - 29 mmol/L    Anion Gap 10 7 - 15 mmol/L    Urea Nitrogen 14.5 6.0 - 20.0 mg/dL    Creatinine 0.78 0.51 - 0.95 mg/dL    Calcium 9.7 8.6 - 10.0 mg/dL    Glucose 104 (H) 70 - 99 mg/dL    GFR Estimate >90 >60 mL/min/1.73m2   CBC with platelets and differential   Result Value Ref Range    WBC Count 8.5 4.0 - 11.0 10e3/uL    RBC Count 4.48 3.80 - 5.20 10e6/uL    Hemoglobin 14.1 11.7 - 15.7 g/dL    Hematocrit 41.2 35.0 - 47.0 %    MCV 92 78 - 100 fL    MCH 31.5 26.5 - 33.0 pg    MCHC 34.2 31.5 - 36.5 g/dL    RDW 12.1 10.0 - 15.0 %    Platelet Count 381 150 - 450 10e3/uL    % Neutrophils 59 %    % Lymphocytes 29 %    % Monocytes 7 %    % Eosinophils 3 %    % Basophils 1 %    % Immature Granulocytes 1 %    NRBCs per 100 WBC 0 <1 /100    Absolute Neutrophils 5.2 1.6 - 8.3 10e3/uL    Absolute Lymphocytes 2.5 0.8 - 5.3 10e3/uL    Absolute Monocytes 0.6 0.0 - 1.3 10e3/uL    Absolute Eosinophils 0.3 0.0 - 0.7 10e3/uL    Absolute Basophils 0.1 0.0 - 0.2 10e3/uL    Absolute Immature Granulocytes 0.0 <=0.4 10e3/uL    Absolute NRBCs 0.0 10e3/uL   Extra Red Top Tube   Result Value Ref Range    Hold Specimen JIC    Troponin T, High Sensitivity (now)   Result Value Ref Range    Troponin T, High Sensitivity  <6 <=14 ng/L       RADIOLOGY:  Reviewed all pertinent imaging. Please see official radiology report.  No orders to display       EKG:    Normal sinus rhythm.  Rate of 80.  Normal QRS.  Normal QT.  Nonspecific T wave changes noted.  Compared to EKG on 4/17/2023 the nonspecific T wave changes appear new.    I have independently reviewed and interpreted the EKG(s) documented above.        I, Meghan Don , am serving as a scribe to document services personally performed by Weston Martínez DO based on my observation and the provider's statements to me. I, Weston Martínez, attest that Meghan Don is acting in a scribe capacity, has observed my performance of the services and has documented them in accordance with my direction.    Weston Martínez DO  Emergency Medicine  [facility] EMERGENCY DEPARTMENT  [address] [place] [id number]  [phone]       Weston Martínez DO  06/15/23 6963

## 2023-06-15 NOTE — DISCHARGE INSTRUCTIONS
Your symptoms appear consistent with benign paroxysmal positional vertigo.  The  laboratory test all appear reassuring here today in the ED.      The prescribed meclizine every 6-8 hours as needed for any further episodes of vertigo and/or nausea.      Follow-up with your primary care provider for reevaluation or return back to ED sooner for any worsening vertigo or any other new or concerning symptoms.

## 2023-06-19 LAB
ATRIAL RATE - MUSE: 80 BPM
DIASTOLIC BLOOD PRESSURE - MUSE: NORMAL MMHG
INTERPRETATION ECG - MUSE: NORMAL
P AXIS - MUSE: 36 DEGREES
PR INTERVAL - MUSE: 148 MS
QRS DURATION - MUSE: 90 MS
QT - MUSE: 384 MS
QTC - MUSE: 442 MS
R AXIS - MUSE: -26 DEGREES
SYSTOLIC BLOOD PRESSURE - MUSE: NORMAL MMHG
T AXIS - MUSE: 10 DEGREES
VENTRICULAR RATE- MUSE: 80 BPM

## 2023-08-14 ENCOUNTER — HOSPITAL ENCOUNTER (EMERGENCY)
Facility: HOSPITAL | Age: 38
Discharge: HOME | End: 2023-08-14
Attending: EMERGENCY MEDICINE | Admitting: EMERGENCY MEDICINE
Payer: COMMERCIAL

## 2023-08-14 VITALS
BODY MASS INDEX: 37.91 KG/M2 | SYSTOLIC BLOOD PRESSURE: 118 MMHG | TEMPERATURE: 99 F | HEIGHT: 62 IN | OXYGEN SATURATION: 96 % | WEIGHT: 206 LBS | RESPIRATION RATE: 16 BRPM | DIASTOLIC BLOOD PRESSURE: 63 MMHG | HEART RATE: 75 BPM

## 2023-08-14 DIAGNOSIS — J02.9 ACUTE PHARYNGITIS, UNSPECIFIED ETIOLOGY: ICD-10-CM

## 2023-08-14 LAB
FLUAV RNA SPEC QL NAA+PROBE: NEGATIVE
FLUBV RNA RESP QL NAA+PROBE: NEGATIVE
GROUP A STREP BY PCR: NOT DETECTED
RSV RNA SPEC NAA+PROBE: NEGATIVE
SARS-COV-2 RNA RESP QL NAA+PROBE: NEGATIVE

## 2023-08-14 PROCEDURE — 99284 EMERGENCY DEPT VISIT MOD MDM: CPT

## 2023-08-14 PROCEDURE — 250N000013 HC RX MED GY IP 250 OP 250 PS 637: Performed by: EMERGENCY MEDICINE

## 2023-08-14 PROCEDURE — 87651 STREP A DNA AMP PROBE: CPT | Performed by: EMERGENCY MEDICINE

## 2023-08-14 PROCEDURE — 250N000011 HC RX IP 250 OP 636: Performed by: EMERGENCY MEDICINE

## 2023-08-14 PROCEDURE — 250N000009 HC RX 250: Performed by: EMERGENCY MEDICINE

## 2023-08-14 PROCEDURE — 87637 SARSCOV2&INF A&B&RSV AMP PRB: CPT | Performed by: EMERGENCY MEDICINE

## 2023-08-14 RX ORDER — DEXAMETHASONE SODIUM PHOSPHATE 10 MG/ML
10 INJECTION, SOLUTION INTRAMUSCULAR; INTRAVENOUS ONCE
Status: COMPLETED | OUTPATIENT
Start: 2023-08-14 | End: 2023-08-14

## 2023-08-14 RX ORDER — ONDANSETRON 4 MG/1
4 TABLET, ORALLY DISINTEGRATING ORAL EVERY 6 HOURS PRN
Qty: 20 TABLET | Refills: 0 | Status: SHIPPED | OUTPATIENT
Start: 2023-08-14

## 2023-08-14 RX ORDER — OXYCODONE HYDROCHLORIDE 5 MG/1
5 TABLET ORAL ONCE
Status: COMPLETED | OUTPATIENT
Start: 2023-08-14 | End: 2023-08-14

## 2023-08-14 RX ORDER — ACETAMINOPHEN 325 MG/1
975 TABLET ORAL ONCE
Status: COMPLETED | OUTPATIENT
Start: 2023-08-14 | End: 2023-08-14

## 2023-08-14 RX ORDER — PREDNISONE 20 MG/1
40 TABLET ORAL DAILY
Qty: 8 TABLET | Refills: 0 | Status: SHIPPED | OUTPATIENT
Start: 2023-08-14

## 2023-08-14 RX ORDER — ONDANSETRON 4 MG/1
4 TABLET, ORALLY DISINTEGRATING ORAL ONCE
Status: COMPLETED | OUTPATIENT
Start: 2023-08-14 | End: 2023-08-14

## 2023-08-14 RX ADMIN — ACETAMINOPHEN 975 MG: 325 TABLET ORAL at 06:16

## 2023-08-14 RX ADMIN — DEXAMETHASONE SODIUM PHOSPHATE 10 MG: 10 INJECTION, SOLUTION INTRAMUSCULAR; INTRAVENOUS at 06:16

## 2023-08-14 RX ADMIN — ONDANSETRON 4 MG: 4 TABLET, ORALLY DISINTEGRATING ORAL at 07:03

## 2023-08-14 RX ADMIN — OXYCODONE HYDROCHLORIDE 5 MG: 5 TABLET ORAL at 07:09

## 2023-08-14 ASSESSMENT — ACTIVITIES OF DAILY LIVING (ADL): ADLS_ACUITY_SCORE: 35

## 2023-08-14 NOTE — ED PROVIDER NOTES
EMERGENCY DEPARTMENT ENCOUNTER      NAME: Katelynn Cronin  AGE: 38 year old female  YOB: 1985  MRN: [id number]  EVALUATION DATE & TIME: 2023  5:36 AM    PCP: Lokesh Logan    ED PROVIDER: Kraig Young M.D.      Chief Complaint   Patient presents with    Pharyngitis         FINAL IMPRESSION:  1. Acute pharyngitis, unspecified etiology          ED COURSE & MEDICAL DECISION MAKIN year old female presents to the Emergency Department for evaluation of sore throat.  Patient presenting with sore throat for the last 24 hours.  She is vitally stable when she arrives to the emergency department.  She does have visible tonsillar hypertrophy bilaterally however no visible exudates.  She has a bit of shotty cervical lymphadenopathy but neck is supple.  There is no evidence of drainable fluid collections like peritonsillar abscess and no evidence of deep space neck infection.  COVID, influenza, and strep screening tests were obtained and negative.  Patient was given Tylenol and a dose of dexamethasone here.  She continued to have throat pain but tolerated these medicines.  She was given a single dose of oxycodone prior to discharge.  I do not think there would be a strong indication for antibiotics with a negative strep test, no exudates and a reassuring examination overall and more likely viral etiology of pharyngitis.  Clinic follow-up was advised for any symptoms not improving over the next several days.  Patient was discharged in stable condition.    At the conclusion of the encounter I discussed the results of all of the tests and the disposition. The questions were answered. The patient or family acknowledged understanding and was agreeable with the care plan.       Medical Decision Making    History:  Supplemental history from: Documented in chart, if applicable  External Record(s) reviewed: Documented in chart, if applicable.    Work Up:  Chart documentation includes differential considered  and any EKGs or imaging independently interpreted by provider, where specified.  In additional to work up documented, I considered the following work up: Documented in chart, if applicable.    External consultation:  Discussion of management with another provider: Documented in chart, if applicable    Complicating factors:  Care impacted by chronic illness: N/A  Care affected by social determinants of health: N/A    Disposition considerations: Discharge. No recommendations on prescription strength medication(s). See documentation for any additional details.            MEDICATIONS GIVEN IN THE EMERGENCY:  Medications   dexAMETHasone (PF) (DECADRON) injectable solution used ORALLY 10 mg (10 mg Oral $Given 8/14/23 0616)   acetaminophen (TYLENOL) tablet 975 mg (975 mg Oral $Given 8/14/23 0616)   ondansetron (ZOFRAN ODT) ODT tab 4 mg (4 mg Oral $Given 8/14/23 0703)   oxyCODONE (ROXICODONE) tablet 5 mg (5 mg Oral $Given 8/14/23 0709)       NEW PRESCRIPTIONS STARTED AT TODAY'S ER VISIT  Discharge Medication List as of 8/14/2023  7:24 AM        START taking these medications    Details   ondansetron (ZOFRAN ODT) 4 MG ODT tab Take 1 tablet (4 mg) by mouth every 6 hours as needed for nausea or vomiting, Disp-20 tablet, R-0, Local Print      predniSONE (DELTASONE) 20 MG tablet Take 2 tablets (40 mg) by mouth daily, Disp-8 tablet, R-0, Local Print                =================================================================    HPI    Patient information was obtained from: Patient      Katelynn Cronin is a 38 year old female who presents to this ED today for evaluation of sore throat.  Patient presenting with about 24 hours of pain in her throat.  She notes more pain with swallowing.  She has not recorded fever at home.  She has a history of enlarged tonsils per her report.  She denies any cough or congestion.  No sick contacts.  Took ibuprofen a couple hours prior to arrival here but pain is persistent.      REVIEW OF  SYSTEMS   All systems reviewed and negative except as noted in HPI.    PAST MEDICAL HISTORY:  Past Medical History:   Diagnosis Date    Acute midline low back pain without sciatica 2/10/2019    Formatting of this note might be different from the original. Slipped and fell directly onto her back on 2/4/2019. Was seen at [facility] ER on 2/4/2019 and lumbar spine x-rays showed an age indeterminate 20% compression of L2 (but patient was not found to have tenderness to palpation over L2 on clinical exam in the ER), and lumbar spine, pelvis and left hip x-rays were otherwise unremarkable    Alcohol abuse 8/31/2018    Formatting of this note might be different from the original. [facility] ER visit on 6/29/2018 for panic attack with alcohol intoxication (blood alcohol level 0.125).  Hospitalization at [facility] 8/13-8/20/2018 for alcohol intoxication (MILTON 0.16) and acetaminophen overdose. Patient was voluntarily admitted to the inpatient psychiatric unit for observation and treatment following medi    Allergic rhinitis 5/7/2014    Formatting of this note might be different from the original. Her symptoms are worst during the Spring (?) Skin Prick Testing was performed on: 6/13/2014  Sensitive to: English Plantain & Molds    Amblyopia 7/8/2010    Formatting of this note might be different from the original. Right eye.    Anxiety 2/29/2012    Formatting of this note might be different from the original. Zoloft started 2/29/12.  Switched to Effexor XR 1/15/2014.    MACARIO III (cervical intraepithelial neoplasia grade III) with severe dysplasia 10/29/2013    Formatting of this note might be different from the original. 09/04/2012 ASCUSHPV+ 10/02/2012 [facility] MACARIO I 09/10/2013 [facility]/ASCUS/HPV+ 10/10/2013 [facility] MACARIO II 10/29/2013 [facility] MACARIO II-III, Negative margins 01/29/2015 NIL/HPV negative 03/06/2015 NIL/HPV negative 05/17/2016 NIL/HPV negative 02/28/2019 NIL/HPV negative  ASCCP recommends:  History of CIN2 - CIN3:  Pap  and HPV every 3 years for 20 years.   Pl    Dysthymic disorder 1/25/2013    Eczema 2/19/2015    Hypercholesterolemia 2/19/2015    Formatting of this note might be different from the original. Mildly elevated total and LDL cholesterol levels, along with a low HDL level, noted 2/19/2015.    Lateral epicondylitis of right elbow 8/28/2014    Moderate episode of recurrent major depressive disorder (H) 3/4/2019    Formatting of this note might be different from the original. Zoloft started 2/29/12. Previously was on medication from 9649-3063, and briefly again in 2009 for postpartum depression. Effexor XR started on 1/15/2014. She is also following with Psychology for counseling therapy.    Overweight 6/29/2009    PPH (postpartum hemorrhage) 3/13/2009    Vitamin D deficiency 6/13/2014    Formatting of this note might be different from the original. Treatment started 6/13/2014 with Vitamin D 5,000 units daily per Allergy specialist.    Vocal cord dysfunction 6/15/2014    Formatting of this note might be different from the original. Diagnosed by Allergy specialist 6/13/2014, and treatment with Speech Therapy offered if the patient wishes to pursue this.       PAST SURGICAL HISTORY:  Past Surgical History:   Procedure Laterality Date    COLPOSCOPY CERVIX, LOOP ELECTRODE BIOPSY, COMBINED      wisdom teeth Bilateral            CURRENT MEDICATIONS:    No current facility-administered medications for this encounter.     Current Outpatient Medications   Medication    ondansetron (ZOFRAN ODT) 4 MG ODT tab    predniSONE (DELTASONE) 20 MG tablet    cetirizine (ZYRTEC) 10 MG tablet    diazepam (VALIUM) 5 MG tablet    gabapentin (NEURONTIN) 100 MG capsule    hydrOXYzine (VISTARIL) 50 MG capsule    meclizine (ANTIVERT) 25 MG tablet    methocarbamol (ROBAXIN) 750 MG tablet    naproxen (NAPROSYN) 500 MG tablet    QUEtiapine (SEROQUEL) 50 MG tablet    selenium sulfide 2.25 % Sham    venlafaxine (EFFEXOR-XR) 150 MG 24 hr capsule  "        ALLERGIES:  Allergies   Allergen Reactions    Grass Extracts [Gramineae Pollens] Rash and Swelling     Swelling, itching, and no voice   Swelling, itching, and no voice       Latex Rash    Pollen Extracts [Pollen Extract] Rash     Swelling, itching, and no voice        FAMILY HISTORY:  No family history on file.    SOCIAL HISTORY:   Social History     Socioeconomic History    Marital status: Single   Tobacco Use    Smoking status: Never    Smokeless tobacco: Never   Substance and Sexual Activity    Alcohol use: Yes    Drug use: Not Currently    Sexual activity: Not Currently       VITALS:  /63   Pulse 75   Temp 99  F (37.2  C) (Oral)   Resp 16   Ht 1.575 m (5' 2\")   Wt 93.4 kg (206 lb)   SpO2 96%   Breastfeeding No   BMI 37.68 kg/m      PHYSICAL EXAM    Constitutional: Well developed, Well nourished, NAD.  HENT: No intraoral lesions.  There is bilateral tonsillar hypertrophy without any exudates.  Oropharynx is mildly erythematous.  There is no peritonsillar or other asymmetric swelling.  Patient is sitting back in bed and handling secretions.  There is shotty cervical lymphadenopathy.  Neck is supple and easily ranging greater than 30 degrees bilaterally.  Eyes: EOMI, Conjunctiva normal.  Respiratory: Breathing comfortably on room air. Speaks full sentences easily. Lungs clear to ascultation.  Cardiovascular: Normal heart rate, Regular rhythm. No peripheral edema.  Abdomen: Soft, nontender  Musculoskeletal: Good range of motion in all major joints. No major deformities noted.  Integument: Warm, Dry.  Neurologic: Alert & awake, Normal motor function, Normal sensory function, No focal deficits noted.   Psychiatric: Cooperative. Affect appropriate.     LAB:  All pertinent labs reviewed and interpreted.  Labs Ordered and Resulted from Time of ED Arrival to Time of ED Departure   INFLUENZA A/B, RSV, & SARS-COV2 PCR - Normal       Result Value    Influenza A PCR Negative      Influenza B PCR " Negative      RSV PCR Negative      SARS CoV2 PCR Negative     GROUP A STREPTOCOCCUS PCR THROAT SWAB - Normal    Group A strep by PCR Not Detected           Kraig Young M.D.  Emergency Medicine  [facility] EMERGENCY DEPARTMENT  [address] [place] [id number] [phone]  Dept: [phone]       Kraig Young MD  08/16/23 0689

## 2023-08-14 NOTE — ED TRIAGE NOTES
Pt reports onset of a sore throat yesterday. Reports pain with swallowing. Pain radiates to bilateral ears. Pt took 800 mg of Ibuprofen at 0300 am.

## 2023-08-14 NOTE — DISCHARGE INSTRUCTIONS
You were seen in the emergency department for sore throat.  Your evaluation included COVID testing, influenza testing, and strep throat testing. This testing was all negative here.  You were given a first dose of steroid which should help a lot with pain and inflammation over the next 24 hours.  You can continue prednisone daily starting tomorrow for a few days after this.  We would recommend continuing scheduled Tylenol 650 mg and ibuprofen 600 mg every 6 hours as long as you are having pain.  Please make sure you are drinking plenty of liquids to stay well-hydrated.  We expect your symptoms will get better within the next several days.  If things are lingering more than a week it would be a good idea to check in with your clinic.  If you have any other immediate concerns like inability to breathe or swallow, we can reevaluate you in the emergency department.

## 2024-07-11 ENCOUNTER — APPOINTMENT (OUTPATIENT)
Dept: RADIOLOGY | Facility: HOSPITAL | Age: 39
End: 2024-07-11
Attending: EMERGENCY MEDICINE
Payer: COMMERCIAL

## 2024-07-11 ENCOUNTER — HOSPITAL ENCOUNTER (EMERGENCY)
Facility: HOSPITAL | Age: 39
Discharge: HOME | End: 2024-07-11
Attending: EMERGENCY MEDICINE | Admitting: EMERGENCY MEDICINE
Payer: COMMERCIAL

## 2024-07-11 VITALS
SYSTOLIC BLOOD PRESSURE: 111 MMHG | BODY MASS INDEX: 37.17 KG/M2 | RESPIRATION RATE: 22 BRPM | TEMPERATURE: 97.5 F | HEART RATE: 79 BPM | HEIGHT: 62 IN | OXYGEN SATURATION: 99 % | WEIGHT: 202 LBS | DIASTOLIC BLOOD PRESSURE: 57 MMHG

## 2024-07-11 DIAGNOSIS — M54.42 ACUTE LEFT-SIDED LOW BACK PAIN WITH LEFT-SIDED SCIATICA: ICD-10-CM

## 2024-07-11 DIAGNOSIS — M25.552 HIP PAIN, LEFT: ICD-10-CM

## 2024-07-11 PROCEDURE — 96372 THER/PROPH/DIAG INJ SC/IM: CPT | Performed by: EMERGENCY MEDICINE

## 2024-07-11 PROCEDURE — 250N000011 HC RX IP 250 OP 636: Performed by: EMERGENCY MEDICINE

## 2024-07-11 PROCEDURE — 99284 EMERGENCY DEPT VISIT MOD MDM: CPT

## 2024-07-11 PROCEDURE — 73502 X-RAY EXAM HIP UNI 2-3 VIEWS: CPT

## 2024-07-11 RX ORDER — HYDROCODONE BITARTRATE AND ACETAMINOPHEN 5; 325 MG/1; MG/1
1 TABLET ORAL EVERY 6 HOURS PRN
Qty: 10 TABLET | Refills: 0 | Status: SHIPPED | OUTPATIENT
Start: 2024-07-11 | End: 2024-07-14

## 2024-07-11 RX ORDER — KETOROLAC TROMETHAMINE 30 MG/ML
60 INJECTION, SOLUTION INTRAMUSCULAR; INTRAVENOUS ONCE
Status: COMPLETED | OUTPATIENT
Start: 2024-07-11 | End: 2024-07-11

## 2024-07-11 RX ORDER — IBUPROFEN 600 MG/1
600 TABLET, FILM COATED ORAL EVERY 6 HOURS PRN
Qty: 16 TABLET | Refills: 0 | Status: SHIPPED | OUTPATIENT
Start: 2024-07-11

## 2024-07-11 RX ADMIN — KETOROLAC TROMETHAMINE 60 MG: 30 INJECTION, SOLUTION INTRAMUSCULAR; INTRAVENOUS at 00:40

## 2024-07-11 ASSESSMENT — ACTIVITIES OF DAILY LIVING (ADL): ADLS_ACUITY_SCORE: 37

## 2024-07-11 ASSESSMENT — COLUMBIA-SUICIDE SEVERITY RATING SCALE - C-SSRS
6. HAVE YOU EVER DONE ANYTHING, STARTED TO DO ANYTHING, OR PREPARED TO DO ANYTHING TO END YOUR LIFE?: YES
1. IN THE PAST MONTH, HAVE YOU WISHED YOU WERE DEAD OR WISHED YOU COULD GO TO SLEEP AND NOT WAKE UP?: NO
2. HAVE YOU ACTUALLY HAD ANY THOUGHTS OF KILLING YOURSELF IN THE PAST MONTH?: NO

## 2024-07-11 NOTE — Clinical Note
Katelynn Cronin was seen and treated in our emergency department on 7/11/2024.  She may return to work on 07/15/2024.  May return to work but will require modification of work environment to prevent prolonged sitting for greater than 60 minutes at a time or prolonged standing for greater than 60 minutes at a time.  As well no heavy lifting greater than 20 pounds from the floor or 10 pounds above your head.     If you have any questions or concerns, please don't hesitate to call.      Adrian Chu MD

## 2024-07-11 NOTE — ED TRIAGE NOTES
Pt reports ongoing L leg pain for past month, no specific trauma to the area.  Pt reports pain had been getting worse today, when she arrived to the ED pain was unbearable and was unable to ambulate.  Pt assisted to the WC in the parking lot.       Triage Assessment (Adult)       Row Name 07/11/24 0010          Triage Assessment    Airway WDL WDL        Respiratory WDL    Respiratory WDL WDL        Skin Circulation/Temperature WDL    Skin Circulation/Temperature WDL WDL        Cardiac WDL    Cardiac WDL WDL        Peripheral/Neurovascular WDL    Peripheral Neurovascular WDL WDL        Cognitive/Neuro/Behavioral WDL    Cognitive/Neuro/Behavioral WDL WDL

## 2024-07-11 NOTE — ED PROVIDER NOTES
NAME: Katelynn Cronin  AGE: 38 year old female  YOB: 1985  MRN: [id number]  EVALUATION DATE & TIME: 2024 12:12 AM    PCP: Lokesh Logan    ED PROVIDER: Adrian Chu M.D.      Chief Complaint   Patient presents with    Leg Pain     FINAL IMPRESSION:  1. Hip pain, left    2. Acute left-sided low back pain with left-sided sciatica      MEDICAL DECISION MAKIN:36 AM Patient was clinically assessed and consented to treatment. After assessment, medical decision making and workup were discussed with the patient. The patient was agreeable to plan for testing, workup, and treatment.  Pertinent Labs & Imaging studies reviewed. (See chart for details)       Medical Decision Making  Obtained supplemental history:Supplemental history obtained?: No  Reviewed external records: External records reviewed?: Documented in chart  Care impacted by chronic illness:Mental Health  Care significantly affected by social determinants of health:Access to Medical Care  Did you consider but not order tests?: Work up considered but not performed and documented in chart, if applicable  Did you interpret images independently?: Independent interpretation of ECG and images noted in documentation, when applicable.  Consultation discussion with other provider:Did you involve another provider (consultant, MH, pharmacy, etc.)?: No  Discharge. I prescribed additional prescription strength medication(s) as charted. See documentation for any additional details.    Katelynn Cronin is a 38 year old female who presents with leg/back pain.   Differential diagnosis includes but not limited to lumbar radiculopathy, sciatica, hip flexor strain, bursitis.  Patient is 38-year-old female who was twisting while standing and did not feel a pop of her left hip but had sudden onset of pain that is mostly in the buttock and wraps around with some tingly pins-and-needles pain.  She does feel little bit on the medial thigh but no perineal  numbness or tingling, no incontinence.  Patient without a history of back problems and unlikely cauda equina.  Patient's pain is more in the buttock to hip.  There is no shortening or deformity and patient able to place weight on it but the pain is exacerbated by the flexion and standing with pressure on the leg.  Straight leg raise was positive but no shooting pain down below the buttock just into the buttock it was worsened.  Likely compression of sciatic nerve in the pelvis and after negative x-ray without any findings of joint effusion or significant swelling I feel this is likely sciatic nerve strain possibly across the SI joint or in the ischial notch.  Patient initially had screened positive for suicide but does not have any active plans and reports no current thoughts of harming herself.  Patient feeling better after Toradol and will be plan for discharge home with ibuprofen and few tablets of stronger pain medication as needed.  She was given instructions regarding sciatic nerve rest and exercises with plan for follow-up with her primary doctor.    0 minutes of critical care time    MEDICATIONS GIVEN IN THE EMERGENCY:  Medications   ketorolac (TORADOL) injection 60 mg (60 mg Intramuscular $Given 7/11/24 0040)       NEW PRESCRIPTIONS STARTED AT TODAY'S ER VISIT:  Discharge Medication List as of 7/11/2024  1:51 AM        START taking these medications    Details   HYDROcodone-acetaminophen (NORCO) 5-325 MG tablet Take 1 tablet by mouth every 6 hours as needed for severe pain or breakthrough pain, Disp-10 tablet, R-0, Local Print      ibuprofen (ADVIL/MOTRIN) 600 MG tablet Take 1 tablet (600 mg) by mouth every 6 hours as needed for moderate pain, Disp-16 tablet, R-0, Local Print                =================================================================    HPI    Patient information was obtained from: The patient    Use of : N/A         Katelynn Cronin is a 38 year old female with a past  medical history of acute midline low back pain without sciatica, who presents with leg pain.    The patient reports she developed a sudden onset of tingling pain to the left buttock that radiates to the left anterior thigh as she turned her body while standing. No history of sciatica. She is able to ambulate but notes significant pain with it. She has been dealing with intermittent left knee pain for quite some time due to previous falls. No chance of pregnancy. No known allergies to any medication. No other medical complaints at this time.      REVIEW OF SYSTEMS   Review of Systems   Musculoskeletal:         Positive for tingling pain from left buttock that radiates to left anterior thigh.   All other systems reviewed and are negative.       PAST MEDICAL HISTORY:  Past Medical History:   Diagnosis Date    Acute midline low back pain without sciatica 2/10/2019    Formatting of this note might be different from the original. Slipped and fell directly onto her back on 2/4/2019. Was seen at [facility] ER on 2/4/2019 and lumbar spine x-rays showed an age indeterminate 20% compression of L2 (but patient was not found to have tenderness to palpation over L2 on clinical exam in the ER), and lumbar spine, pelvis and left hip x-rays were otherwise unremarkable    Alcohol abuse 8/31/2018    Formatting of this note might be different from the original. [facility] ER visit on 6/29/2018 for panic attack with alcohol intoxication (blood alcohol level 0.125).  Hospitalization at [facility] 8/13-8/20/2018 for alcohol intoxication (MILTON 0.16) and acetaminophen overdose. Patient was voluntarily admitted to the inpatient psychiatric unit for observation and treatment following medi    Allergic rhinitis 5/7/2014    Formatting of this note might be different from the original. Her symptoms are worst during the Spring (?) Skin Prick Testing was performed on: 6/13/2014  Sensitive to: English Plantain & Molds    Amblyopia  7/8/2010    Formatting of this note might be different from the original. Right eye.    Anxiety 2/29/2012    Formatting of this note might be different from the original. Zoloft started 2/29/12.  Switched to Effexor XR 1/15/2014.    MACARIO III (cervical intraepithelial neoplasia grade III) with severe dysplasia 10/29/2013    Formatting of this note might be different from the original. 09/04/2012 [facility]PV+ 10/02/2012 [facility] MACARIO I 09/10/2013 [facility]/ASCUS/HPV+ 10/10/2013 [facility] MACARIO II 10/29/2013 [facility] MACARIO II-III, Negative margins 01/29/2015 NIL/HPV negative 03/06/2015 NIL/HPV negative 05/17/2016 NIL/HPV negative 02/28/2019 NIL/HPV negative  AS[facility]P recommends:  History of CIN2 - CIN3:  Pap and HPV every 3 years for 20 years.   Pl    Dysthymic disorder 1/25/2013    Eczema 2/19/2015    Hypercholesterolemia 2/19/2015    Formatting of this note might be different from the original. Mildly elevated total and LDL cholesterol levels, along with a low HDL level, noted 2/19/2015.    Lateral epicondylitis of right elbow 8/28/2014    Moderate episode of recurrent major depressive disorder (H) 3/4/2019    Formatting of this note might be different from the original. Zoloft started 2/29/12. Previously was on medication from 0402-0291, and briefly again in 2009 for postpartum depression. Effexor XR started on 1/15/2014. She is also following with Psychology for counseling therapy.    Overweight 6/29/2009    PPH (postpartum hemorrhage) 3/13/2009    Vitamin D deficiency 6/13/2014    Formatting of this note might be different from the original. Treatment started 6/13/2014 with Vitamin D 5,000 units daily per Allergy specialist.    Vocal cord dysfunction 6/15/2014    Formatting of this note might be different from the original. Diagnosed by Allergy specialist 6/13/2014, and treatment with Speech Therapy offered if the patient wishes to pursue this.       PAST SURGICAL HISTORY:  Past Surgical History:   Procedure Laterality Date    COLPOSCOPY  CERVIX, LOOP ELECTRODE BIOPSY, COMBINED      wisdom teeth Bilateral        CURRENT MEDICATIONS:    No current facility-administered medications for this encounter.    Current Outpatient Medications:     HYDROcodone-acetaminophen (NORCO) 5-325 MG tablet, Take 1 tablet by mouth every 6 hours as needed for severe pain or breakthrough pain, Disp: 10 tablet, Rfl: 0    ibuprofen (ADVIL/MOTRIN) 600 MG tablet, Take 1 tablet (600 mg) by mouth every 6 hours as needed for moderate pain, Disp: 16 tablet, Rfl: 0    cetirizine (ZYRTEC) 10 MG tablet, [CETIRIZINE (ZYRTEC) 10 MG TABLET] Take 10 mg by mouth., Disp: , Rfl:     diazepam (VALIUM) 5 MG tablet, Take 1 tablet (5 mg) by mouth every 6 hours as needed for muscle spasms, Disp: 5 tablet, Rfl: 0    gabapentin (NEURONTIN) 100 MG capsule, [GABAPENTIN (NEURONTIN) 100 MG CAPSULE] Take 100 mg by mouth., Disp: , Rfl:     hydrOXYzine (VISTARIL) 50 MG capsule, Take 1 capsule (50 mg) by mouth 3 times daily as needed for anxiety, Disp: 20 capsule, Rfl: 0    meclizine (ANTIVERT) 25 MG tablet, Take 1 tablet (25 mg) by mouth 3 times daily as needed for dizziness, Disp: 20 tablet, Rfl: 0    methocarbamol (ROBAXIN) 750 MG tablet, Take 1-2 tablets (750-1,500 mg) by mouth 4 times daily as needed for muscle spasms, Disp: 30 tablet, Rfl: 0    naproxen (NAPROSYN) 500 MG tablet, [NAPROXEN (NAPROSYN) 500 MG TABLET] Take 500 mg by mouth., Disp: , Rfl:     ondansetron (ZOFRAN ODT) 4 MG ODT tab, Take 1 tablet (4 mg) by mouth every 6 hours as needed for nausea or vomiting, Disp: 20 tablet, Rfl: 0    predniSONE (DELTASONE) 20 MG tablet, Take 2 tablets (40 mg) by mouth daily, Disp: 8 tablet, Rfl: 0    QUEtiapine (SEROQUEL) 50 MG tablet, [QUETIAPINE (SEROQUEL) 50 MG TABLET] Take 50 mg by mouth., Disp: , Rfl:     selenium sulfide 2.25 % Sham, [SELENIUM SULFIDE 2.25 % SHAM] Apply 6 mL topically daily., Disp: 180 mL, Rfl: 0    venlafaxine (EFFEXOR-XR) 150 MG 24 hr capsule, [VENLAFAXINE (EFFEXOR-XR) 150 MG 24  "HR CAPSULE] TAKE 2 CAPSULES BY MOUTH EVERY DAY WITH A MEAL, Disp: , Rfl:     ALLERGIES:  Allergies   Allergen Reactions    Grass Extracts [Gramineae Pollens] Rash and Swelling     Swelling, itching, and no voice   Swelling, itching, and no voice       Latex Rash    Pollen Extracts [Pollen Extract] Rash     Swelling, itching, and no voice        FAMILY HISTORY:  No family history on file.    SOCIAL HISTORY:   Social History     Socioeconomic History    Marital status: Single   Tobacco Use    Smoking status: Never    Smokeless tobacco: Never   Substance and Sexual Activity    Alcohol use: Yes    Drug use: Not Currently    Sexual activity: Not Currently     Social Determinants of Health     Financial Resource Strain: Medium Risk (5/22/2023)    [facility]    Financial Resource Strain     Difficulty of Paying Living Expenses: 2     Difficulty of Paying Living Expenses: 1   Food Insecurity: No Food Insecurity (5/22/2023)    [facility], [facility]    Food Insecurity     Worried About Running Out of Food in the Last Year: 1   Transportation Needs: No Transportation Needs (5/22/2023)    [facility], [facility]    Transportation Needs     Lack of Transportation (Medical): 1    Received from [facility]    Social Connections   Housing Stability: Low Risk  (5/22/2023)    [facility]    Housing Stability     Unable to Pay for Housing in the Last Year: 1       PHYSICAL EXAM:    Vitals: /57   Pulse 79   Temp 97.5  F (36.4  C) (Temporal)   Resp 22   Ht 1.575 m (5' 2\")   Wt 91.6 kg (202 lb)   SpO2 99%   BMI 36.95 kg/m     Physical Exam  Vitals " and nursing note reviewed.   Constitutional:       General: She is not in acute distress.     Appearance: Normal appearance. She is normal weight.   HENT:      Head: Atraumatic.   Cardiovascular:      Pulses: Normal pulses.   Pulmonary:      Effort: Pulmonary effort is normal.   Musculoskeletal:         General: Tenderness present. No deformity.      Lumbar back: No tenderness or bony tenderness. Positive left straight leg raise test. Negative right straight leg raise test.      Left hip: Tenderness and bony tenderness present. No deformity or crepitus. Normal range of motion. Normal strength.        Legs:    Skin:     General: Skin is warm and dry.      Coloration: Skin is not pale.      Findings: No bruising or erythema.   Neurological:      General: No focal deficit present.      Mental Status: She is alert.      Sensory: No sensory deficit.      Motor: No weakness.      Coordination: Coordination normal.   Psychiatric:         Behavior: Behavior normal.         Thought Content: Thought content does not include homicidal or suicidal ideation. Thought content does not include suicidal plan.           LAB:  All pertinent labs reviewed and interpreted.  Labs Ordered and Resulted from Time of ED Arrival to Time of ED Departure - No data to display    RADIOLOGY:  XR Pelvis and Hip Left 2 Views   Final Result   IMPRESSION: Normal joint spaces and alignment. No fracture.          PROCEDURES:   Procedures       I, Lalo Baez, am serving as a scribe to document services personally performed by Dr. Adrian Chu  based on my observation and the provider's statements to me. IAdrian MD attest that Lalo Baez is acting in a scribe capacity, has observed my performance of the services and has documented them in accordance with my direction.      Adrian Chu M.D.  Emergency Medicine  [facility] Emergency Department       Adrian Chu MD  07/11/24 0526       Adrian Chu  MD Bi  07/11/24 0599

## 2024-07-11 NOTE — DISCHARGE INSTRUCTIONS
As discussed in the ER recommend light activity around home but no strenuous activity especially with heavy lifting or significant bending and twisting.  Recommend modification of work environment to prevent prolonged sitting or standing with adjustment and movement every 30-60 minutes to reduce compression on the sciatic nerve especially when sitting.

## 2024-07-27 ENCOUNTER — HEALTH MAINTENANCE LETTER (OUTPATIENT)
Age: 39
End: 2024-07-27

## 2024-10-05 ENCOUNTER — HEALTH MAINTENANCE LETTER (OUTPATIENT)
Age: 39
End: 2024-10-05

## 2024-10-25 ENCOUNTER — HOSPITAL ENCOUNTER (EMERGENCY)
Facility: HOSPITAL | Age: 39
Discharge: HOME | End: 2024-10-25
Attending: EMERGENCY MEDICINE | Admitting: EMERGENCY MEDICINE
Payer: COMMERCIAL

## 2024-10-25 VITALS
TEMPERATURE: 97.4 F | DIASTOLIC BLOOD PRESSURE: 80 MMHG | SYSTOLIC BLOOD PRESSURE: 135 MMHG | HEART RATE: 84 BPM | OXYGEN SATURATION: 95 % | HEIGHT: 62 IN | BODY MASS INDEX: 38.72 KG/M2 | WEIGHT: 210.4 LBS | RESPIRATION RATE: 20 BRPM

## 2024-10-25 DIAGNOSIS — R59.0 CERVICAL LYMPHADENOPATHY: ICD-10-CM

## 2024-10-25 DIAGNOSIS — I88.9 LYMPHADENITIS: ICD-10-CM

## 2024-10-25 PROCEDURE — 99283 EMERGENCY DEPT VISIT LOW MDM: CPT

## 2024-10-25 RX ORDER — AMOXICILLIN 500 MG/1
500 CAPSULE ORAL 3 TIMES DAILY
Qty: 21 CAPSULE | Refills: 0 | Status: SHIPPED | OUTPATIENT
Start: 2024-10-25 | End: 2024-11-01

## 2024-10-25 ASSESSMENT — COLUMBIA-SUICIDE SEVERITY RATING SCALE - C-SSRS
1. IN THE PAST MONTH, HAVE YOU WISHED YOU WERE DEAD OR WISHED YOU COULD GO TO SLEEP AND NOT WAKE UP?: NO
2. HAVE YOU ACTUALLY HAD ANY THOUGHTS OF KILLING YOURSELF IN THE PAST MONTH?: NO
6. HAVE YOU EVER DONE ANYTHING, STARTED TO DO ANYTHING, OR PREPARED TO DO ANYTHING TO END YOUR LIFE?: NO

## 2024-10-26 NOTE — ED PROVIDER NOTES
EMERGENCY DEPARTMENT ENCOUNTER      NAME: Katelynn Cronin  AGE: 39 year old female  YOB: 1985  MRN: [id number]  EVALUATION DATE & TIME: No admission date for patient encounter.    PCP: Lokesh Logan    ED PROVIDER: Patt Waters MD    Chief Complaint   Patient presents with    Jaw Pain         FINAL IMPRESSION:  1. Cervical lymphadenopathy    2. Lymphadenitis          ED COURSE & MEDICAL DECISION MAKING:    Pertinent Labs & Imaging studies reviewed. (See chart for details)  39 year old female with history of anxiety, HLD, TMJ who presents to the Emergency Department for evaluation of an abnormal feeling underneath the left side of her jaw as though she could feel it moving.  On examination she has a palpable left anterior cervical lymph node that is slightly enlarged and slightly tender.  Her tonsils are 3+ but no exudate, petechiae and really she denies any sore throat.  States that this is chronic tonsillar hypertrophy.  However with her tender lymphadenopathy will treat as lymphadenitis with antibiotics for home.  Nothing to suggest sialadenitis, cyst or abscess      ED Course as of 10/26/24 0037   Fri Oct 25, 2024   2210 I met with the patient for the initial interview and physical examination. Discussed plan for treatment and workup in the ED.     2245 The patient was discharged from the emergency department.        Medical Decision Making  Obtained supplemental history:Supplemental history obtained?: No  Reviewed external records: External records reviewed?: Documented in chart  Care impacted by chronic illness:Hyperlipidemia  Did you consider but not order tests?: Work up considered but not performed and documented in chart, if applicable  Did you interpret images independently?: Independent interpretation of ECG and images noted in documentation, when applicable.  Consultation discussion with other provider:Did you involve another provider (consultant, , pharmacy, etc.)?: No  Discharge. I  "prescribed additional prescription strength medication(s) as charted. See documentation for any additional details.    MIPS: Not Applicable      At the conclusion of the encounter I discussed the results of all of the tests and the disposition. The questions were answered. The patient or family acknowledged understanding and was agreeable with the care plan.        MEDICATIONS GIVEN IN THE EMERGENCY:  Medications - No data to display    NEW PRESCRIPTIONS STARTED AT TODAY'S ER VISIT  Discharge Medication List as of 10/25/2024 10:40 PM        START taking these medications    Details   amoxicillin (AMOXIL) 500 MG capsule Take 1 capsule (500 mg) by mouth 3 times daily for 7 days., Disp-21 capsule, R-0, Local Print                =================================================================    HPI    Patient information was obtained from: Patient    Use of Intrepreter: N/A        Katelynn Cronin is a 39 year old female with pertinent medical history of 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     Patient reports that she had been laying down this evening when she sat up and her left bottom jaw began feeling weird, noting that it feels as if her left bone were \"hanging\". She endorses a history of TMJ. Patient notes that she has recently had a cough. When asked about her swollen tonsils, patient states that they have always been this enlarged.     Patient denies any other symptoms at this time.        PAST MEDICAL HISTORY:  Past Medical History:   Diagnosis Date    Acute midline low back pain without sciatica 2/10/2019    Formatting of this note might be different from the original. Slipped and fell directly onto her back on 2/4/2019. Was seen at [facility] ER on 2/4/2019 and lumbar spine x-rays showed an age " indeterminate 20% compression of L2 (but patient was not found to have tenderness to palpation over L2 on clinical exam in the ER), and lumbar spine, pelvis and left hip x-rays were otherwise unremarkable    Alcohol abuse 8/31/2018    Formatting of this note might be different from the original. [facility] ER visit on 6/29/2018 for panic attack with alcohol intoxication (blood alcohol level 0.125).  Hospitalization at [facility] 8/13-8/20/2018 for alcohol intoxication (MILTON 0.16) and acetaminophen overdose. Patient was voluntarily admitted to the inpatient psychiatric unit for observation and treatment following medi    Allergic rhinitis 5/7/2014    Formatting of this note might be different from the original. Her symptoms are worst during the Spring (?) Skin Prick Testing was performed on: 6/13/2014  Sensitive to: English Plantain & Molds    Amblyopia 7/8/2010    Formatting of this note might be different from the original. Right eye.    Anxiety 2/29/2012    Formatting of this note might be different from the original. Zoloft started 2/29/12.  Switched to Effexor XR 1/15/2014.    MACARIO III (cervical intraepithelial neoplasia grade III) with severe dysplasia 10/29/2013    Formatting of this note might be different from the original. 09/04/2012 ASCUSHPV+ 10/02/2012 [place] MACARIO I 09/10/2013 [facility]/ASCUS/HPV+ 10/10/2013 [place] MACARIO II 10/29/2013 [facility] MACARIO II-III, Negative margins 01/29/2015 NIL/HPV negative 03/06/2015 NIL/HPV negative 05/17/2016 NIL/HPV negative 02/28/2019 NIL/HPV negative  ASCCP recommends:  History of CIN2 - CIN3:  Pap and HPV every 3 years for 20 years.   Pl    Dysthymic disorder 1/25/2013    Eczema 2/19/2015    Hypercholesterolemia 2/19/2015    Formatting of this note might be different from the original. Mildly elevated total and LDL cholesterol levels, along with a low HDL level, noted 2/19/2015.    Lateral epicondylitis of right elbow 8/28/2014    Moderate episode of recurrent major depressive  disorder (H) 3/4/2019    Formatting of this note might be different from the original. Zoloft started 2/29/12. Previously was on medication from [id number], and briefly again in 2009 for postpartum depression. Effexor XR started on 1/15/2014. She is also following with Psychology for counseling therapy.    Overweight 6/29/2009    PPH (postpartum hemorrhage) 3/13/2009    Vitamin D deficiency 6/13/2014    Formatting of this note might be different from the original. Treatment started 6/13/2014 with Vitamin D 5,000 units daily per Allergy specialist.    Vocal cord dysfunction 6/15/2014    Formatting of this note might be different from the original. Diagnosed by Allergy specialist 6/13/2014, and treatment with Speech Therapy offered if the patient wishes to pursue this.       PAST SURGICAL HISTORY:  Past Surgical History:   Procedure Laterality Date    COLPOSCOPY CERVIX, LOOP ELECTRODE BIOPSY, COMBINED      wisdom teeth Bilateral        CURRENT MEDICATIONS:    Prior to Admission Medications   Prescriptions Last Dose Informant Patient Reported? Taking?   QUEtiapine (SEROQUEL) 50 MG tablet   Yes No   Sig: [QUETIAPINE (SEROQUEL) 50 MG TABLET] Take 50 mg by mouth.   cetirizine (ZYRTEC) 10 MG tablet   Yes No   Sig: [CETIRIZINE (ZYRTEC) 10 MG TABLET] Take 10 mg by mouth.   diazepam (VALIUM) 5 MG tablet   No No   Sig: Take 1 tablet (5 mg) by mouth every 6 hours as needed for muscle spasms   gabapentin (NEURONTIN) 100 MG capsule   Yes No   Sig: [GABAPENTIN (NEURONTIN) 100 MG CAPSULE] Take 100 mg by mouth.   hydrOXYzine (VISTARIL) 50 MG capsule   No No   Sig: Take 1 capsule (50 mg) by mouth 3 times daily as needed for anxiety   ibuprofen (ADVIL/MOTRIN) 600 MG tablet   No No   Sig: Take 1 tablet (600 mg) by mouth every 6 hours as needed for moderate pain   meclizine (ANTIVERT) 25 MG tablet   No No   Sig: Take 1 tablet (25 mg) by mouth 3 times daily as needed for dizziness   methocarbamol (ROBAXIN) 750 MG tablet   No No   Sig:  "Take 1-2 tablets (750-1,500 mg) by mouth 4 times daily as needed for muscle spasms   naproxen (NAPROSYN) 500 MG tablet   Yes No   Sig: [NAPROXEN (NAPROSYN) 500 MG TABLET] Take 500 mg by mouth.   ondansetron (ZOFRAN ODT) 4 MG ODT tab   No No   Sig: Take 1 tablet (4 mg) by mouth every 6 hours as needed for nausea or vomiting   predniSONE (DELTASONE) 20 MG tablet   No No   Sig: Take 2 tablets (40 mg) by mouth daily   selenium sulfide 2.25 % Sham   No No   Sig: [SELENIUM SULFIDE 2.25 % SHAM] Apply 6 mL topically daily.   venlafaxine (EFFEXOR-XR) 150 MG 24 hr capsule   Yes No   Sig: [VENLAFAXINE (EFFEXOR-XR) 150 MG 24 HR CAPSULE] TAKE 2 CAPSULES BY MOUTH EVERY DAY WITH A MEAL      Facility-Administered Medications: None       ALLERGIES:  Allergies   Allergen Reactions    Grass Extracts [Gramineae Pollens] Rash and Swelling     Swelling, itching, and no voice   Swelling, itching, and no voice       Latex Rash    Pollen Extracts [Pollen Extract] Rash     Swelling, itching, and no voice        FAMILY HISTORY:  No family history on file.    SOCIAL HISTORY:  Social History     Tobacco Use    Smoking status: Never    Smokeless tobacco: Never   Substance Use Topics    Alcohol use: Yes    Drug use: Not Currently        VITALS:  Patient Vitals for the past 24 hrs:   BP Temp Temp src Pulse Resp SpO2 Height Weight   10/25/24 2239 135/80 -- -- 84 -- 95 % -- --   10/25/24 2208 (!) 154/78 97.4  F (36.3  C) Temporal 105 20 92 % 1.575 m (5' 2\") 95.4 kg (210 lb 6.4 oz)       PHYSICAL EXAM    General Appearance: Well-appearing, well-nourished, no acute distress   Head:  Normocephalic, atraumatic  Eyes: onjunctiva/corneas clear  ENT:  Lips, mucosa, and tongue normal; membranes are moist without pallor, anterior cervical lymph nodes and tonsils 3+, patient volunteers that her tonsils are always this swollen.  Moves jaw normally.  She does have some clicking of the TMs bilaterally.  3-4 finger breaths excursion  Neck:  Supple, symmetrical, " trachea midline  Cardio:  Regular rate and rhythm, hypertensive normalized on recheck  Extremities: Normal gait  Skin:  Skin warm, dry, no rashes, no erythema or other redness  Neuro:  Alert and oriented ×3     RADIOLOGY/LABS:  Reviewed all pertinent imaging. Please see official radiology report. All pertinent labs reviewed and interpreted.         The creation of this record is based on the scribe s observations of the work being performed by Patt Waters MD and the provider s statements to them. It was created on her behalf by Karri Trevino, a trained medical scribe. This document has been checked and approved by the attending provider.    Patt Waters MD  Emergency Medicine  [facility] EMERGENCY DEPARTMENT  [facility] [id number] [phone]  Dept: [phone]      Patt Waters MD  10/26/24 0039

## 2024-10-26 NOTE — ED TRIAGE NOTES
"Reports laying down this evening and sitting up and feeling her left side of bottom jaw \"feel weird\".      Hx TMJ.         "

## 2025-02-14 ENCOUNTER — HOSPITAL ENCOUNTER (EMERGENCY)
Facility: HOSPITAL | Age: 40
Discharge: HOME | End: 2025-02-14

## 2025-02-14 VITALS
HEART RATE: 87 BPM | WEIGHT: 200 LBS | DIASTOLIC BLOOD PRESSURE: 79 MMHG | BODY MASS INDEX: 36.8 KG/M2 | SYSTOLIC BLOOD PRESSURE: 122 MMHG | HEIGHT: 62 IN | OXYGEN SATURATION: 96 % | TEMPERATURE: 97.7 F | RESPIRATION RATE: 16 BRPM

## 2025-02-14 DIAGNOSIS — T18.9XXA SWALLOWED FOREIGN BODY, INITIAL ENCOUNTER: ICD-10-CM

## 2025-02-14 PROCEDURE — 250N000013 HC RX MED GY IP 250 OP 250 PS 637

## 2025-02-14 PROCEDURE — 99283 EMERGENCY DEPT VISIT LOW MDM: CPT

## 2025-02-14 RX ORDER — ALUMINA, MAGNESIA, AND SIMETHICONE 2400; 2400; 240 MG/30ML; MG/30ML; MG/30ML
15 SUSPENSION ORAL EVERY 4 HOURS PRN
Qty: 100 ML | Refills: 0 | Status: SHIPPED | OUTPATIENT
Start: 2025-02-14

## 2025-02-14 RX ORDER — MAGNESIUM HYDROXIDE/ALUMINUM HYDROXICE/SIMETHICONE 120; 1200; 1200 MG/30ML; MG/30ML; MG/30ML
15 SUSPENSION ORAL ONCE
Status: COMPLETED | OUTPATIENT
Start: 2025-02-14 | End: 2025-02-14

## 2025-02-14 RX ADMIN — ALUMINUM HYDROXIDE, MAGNESIUM HYDROXIDE, AND SIMETHICONE 15 ML: 200; 200; 20 SUSPENSION ORAL at 19:23

## 2025-02-14 ASSESSMENT — COLUMBIA-SUICIDE SEVERITY RATING SCALE - C-SSRS
1. IN THE PAST MONTH, HAVE YOU WISHED YOU WERE DEAD OR WISHED YOU COULD GO TO SLEEP AND NOT WAKE UP?: NO
6. HAVE YOU EVER DONE ANYTHING, STARTED TO DO ANYTHING, OR PREPARED TO DO ANYTHING TO END YOUR LIFE?: NO
2. HAVE YOU ACTUALLY HAD ANY THOUGHTS OF KILLING YOURSELF IN THE PAST MONTH?: NO

## 2025-02-14 ASSESSMENT — ACTIVITIES OF DAILY LIVING (ADL): ADLS_ACUITY_SCORE: 41

## 2025-02-15 NOTE — ED PROVIDER NOTES
Emergency Department Encounter   NAME: Katelynn Cronin ; AGE: 39 year old female ; YOB: 1985 ; MRN: [id number] ; PCP: Lokesh Logan   ED PROVIDER: Rhoda Monge PA-C    Evaluation Date & Time:   2/14/2025  6:48 PM    CHIEF COMPLAINT:  Swallowed Foreign Body (Hair around uvula)      Impression and Plan   MDM: Katelynn Cronin is a 39 year old female who presents to the ED for evaluation of concerned that she has had a hair stuck in her throat.  Patient chronically pulls her hair and feels some hair got stuck in her mouth last night and since then her mouth has felt irritated.  She is able to swallow and breathe like normal but has been having some gagging due to irritation.  On exam she appears in no acute distress.  She has no oropharyngeal swelling or edema.  Her tonsils appear chronically enlarged however no exudates or erythema.  No uvular deviation.  No hair or other foreign body visualized.  Mildly tachycardic at 103 and mildly hypertensive at 142/81.    Differential diagnosis includes retained foreign body, infectious etiology such as strep pharyngitis, peritonsillar abscess, bacterial tracheitis, Yasmani's angina, other causes such as acid reflux, airway distress.  No foreign body visualized.  Consider obtaining a chest x-ray however with a history of a hair being the retained foreign body would not be able to see this on the chest x-ray.  Will defer this for now.  No history of infectious cause such as throat pain, fevers.  No posterior oropharyngeal erythema, edema, tonsillar exudates, uvular deviation.  No trismus.  Doubt infectious etiology.    Patient is given GI cocktail here.  On reexamination she states maybe it feels a little bit better but did not help much.  I rechecked her mouth and still not able to visualize any hair or other foreign body.  Will plan to discharge her at home with supportive cares.  Discussed that she can try drinking carbonated beverages for easier passage, could  also try an acid reflux medicine for a few days to see if that allows passage.  Discussed maintaining her fluid intake this could also allow her to pass the hair.  She continues to be in no acute distress without any airway edema, difficulty breathing, drooling or other abnormalities.  I think she can follow-up on an outpatient basis if this does not improve.  I did prescribe her with some Maalox to take at home if she feels like this helps discussed return precautions including difficulty breathing, drooling, inability to swallow or other emergencies.  Tachycardia resolved to 87 blood pressure to 122/79 without intervention.  Patient expresses understanding and is discharged in stable condition.     Medical Decision Making  Obtained supplemental history:Supplemental history obtained?: No  Reviewed external records: External records reviewed?: Documented in chart  Care impacted by chronic illness:Documented in Chart  Did you consider but not order tests?: Work up considered but not performed and documented in chart, if applicable  Did you interpret images independently?: Independent interpretation of ECG and images noted in documentation, when applicable.  Consultation discussion with other provider:Did you involve another provider (consultant, MH, pharmacy, etc.)?: No  Discharge. No recommendations on prescription strength medication(s). See documentation for any additional details.    MIPS (CTPE, Dental pain, Pat, Sinusitis, Asthma/COPD, Head Trauma): Not Applicable      Critical Care: 0    ED COURSE:  6:48 PM I met and introduced myself to the patient. I gathered initial history and performed my physical exam. We discussed plan for initial workup.  At the conclusion of the encounter I discussed the results of all the tests and the disposition. The questions were answered. The patient or family acknowledged understanding and was agreeable with the care plan.    FINAL IMPRESSION:    ICD-10-CM    1. Swallowed  foreign body, initial encounter  T18.9XXA             MEDICATIONS GIVEN IN THE EMERGENCY DEPARTMENT:  Medications   alum & mag hydroxide-simethicone (MAALOX) suspension 15 mL (15 mLs Oral $Given 2/14/25 1923)         NEW PRESCRIPTIONS STARTED AT TODAY'S ED VISIT:  Discharge Medication List as of 2/14/2025  7:48 PM        START taking these medications    Details   alum & mag hydroxide-simethicone (MAALOX MAX) 400-400-40 MG/5ML SUSP suspension Take 15 mLs by mouth every 4 hours as needed for indigestion., Disp-100 mL, R-0, E-Prescribe               HPI   Use of Intrepreter: N/A     Katelynn Cronin is a 39 year old female with a pertinent history of trichotillomania, who presents to the ED by private vehicle for evaluation of feeling like her hair is stuck in her throat.  Patient has trichotillomania and was pulling at her hair yesterday evening and felt she got a hair stuck in her throat.  Since then she has noted an irritation around her uvula.  She is able to swallow and breathe without difficulty but reports that every so often she can feel the hair and it causes her to gag.  She denies any other chance of other foreign bodies getting back there.  Denies recent sore throat or sick symptoms.    Patient has tried gargling salt water and increasing her fluid intake at home without much relief.      Medical History     Past Medical History:   Diagnosis Date    Acute midline low back pain without sciatica 2/10/2019    Alcohol abuse 8/31/2018    Allergic rhinitis 5/7/2014    Amblyopia 7/8/2010    Anxiety 2/29/2012    MACARIO III (cervical intraepithelial neoplasia grade III) with severe dysplasia 10/29/2013    Dysthymic disorder 1/25/2013    Eczema 2/19/2015    Hypercholesterolemia 2/19/2015    Lateral epicondylitis of right elbow 8/28/2014    Moderate episode of recurrent major depressive disorder (H) 3/4/2019    Overweight 6/29/2009    PPH (postpartum hemorrhage) 3/13/2009    Vitamin D deficiency 6/13/2014    Vocal cord  "dysfunction 6/15/2014       Past Surgical History:   Procedure Laterality Date    COLPOSCOPY CERVIX, LOOP ELECTRODE BIOPSY, COMBINED      wisdom teeth Bilateral        No family history on file.    Social History     Tobacco Use    Smoking status: Never    Smokeless tobacco: Never   Substance Use Topics    Alcohol use: Yes    Drug use: Not Currently       alum & mag hydroxide-simethicone (MAALOX MAX) 400-400-40 MG/5ML SUSP suspension  cetirizine (ZYRTEC) 10 MG tablet  diazepam (VALIUM) 5 MG tablet  gabapentin (NEURONTIN) 100 MG capsule  hydrOXYzine (VISTARIL) 50 MG capsule  ibuprofen (ADVIL/MOTRIN) 600 MG tablet  meclizine (ANTIVERT) 25 MG tablet  methocarbamol (ROBAXIN) 750 MG tablet  naproxen (NAPROSYN) 500 MG tablet  ondansetron (ZOFRAN ODT) 4 MG ODT tab  predniSONE (DELTASONE) 20 MG tablet  QUEtiapine (SEROQUEL) 50 MG tablet  selenium sulfide 2.25 % Sham  venlafaxine (EFFEXOR-XR) 150 MG 24 hr capsule          Physical Exam     First Vitals:  Patient Vitals for the past 24 hrs:   BP Temp Pulse Resp SpO2 Height Weight   02/14/25 1944 122/79 -- 87 16 96 % -- --   02/14/25 1852 -- -- -- -- -- 1.575 m (5' 2\") 90.7 kg (200 lb)   02/14/25 1826 (!) 142/81 97.7  F (36.5  C) 103 20 95 % -- 90.7 kg (200 lb)         PHYSICAL EXAM:   Physical Exam    Constitutional: alert,  no acute distress,  not ill-appearing  Head: normocephalic, atraumatic  Ears: right and left canal normal limit, right and left TM normal  Nose: no congestion or rhinorrhea   Mouth/Throat: moist, clear, no erythema or exudate, large 3+ tonsils, no uvular deviation, no hair visualized   Eyes: PERRL, EOM intact  Eyes: EOM intact   Neck: ROM normal  Cardio: regular rate  Pulmonary: effort normal   Abdominal: flat, no distention  MSK: no obvious swelling or deformity  Skin: no visible rashes or erythema   Neuro: no gross focal deficit, acting per baseline   Psychiatric: mood and behavior within normal limit    Results     LAB:  All pertinent labs reviewed and " interpreted  Labs Ordered and Resulted from Time of ED Arrival to Time of ED Departure - No data to display     RADIOLOGY:  No orders to display       PROCEDURES:  None       Rhoda Monge PA-C   Emergency Medicine   [facility] EMERGENCY DEPARTMENT       Rhoda Monge PA-C  02/14/25 1838

## 2025-02-15 NOTE — ED TRIAGE NOTES
Pt has TRICH and pulls her hair; she thinks that somehow a piece of her hair has made its way around her uvula and it is bothersome and is making her gag. Pt started feeling like this last evening and the feeling is worse today.      Triage Assessment (Adult)       Row Name 02/14/25 4986          Triage Assessment    Airway WDL X  hair in throat        Respiratory WDL    Respiratory WDL WDL        Skin Circulation/Temperature WDL    Skin Circulation/Temperature WDL WDL        Cardiac WDL    Cardiac WDL WDL        Peripheral/Neurovascular WDL    Peripheral Neurovascular WDL WDL        Cognitive/Neuro/Behavioral WDL    Cognitive/Neuro/Behavioral WDL WDL

## 2025-02-15 NOTE — DISCHARGE INSTRUCTIONS
Patient discussed is not able to visualize any foreign body.  You can take the Maalox as needed for any foreign body sensation.  You could even try an acid reflux medicine like Prilosec daily for the next few days or so to see if this allows the foreign body to pass.  I would also recommend trying a carbonated beverages such as soda or sparkling water as sometimes this can dilate the esophagus and make food impactions or other foreign bodies past easier.  If you have any scary symptoms like difficulty breathing or inability to swallow come back here for reevaluation.  Otherwise I would say follow-up with your primary care regarding your concerns.

## 2025-04-23 ENCOUNTER — HOSPITAL ENCOUNTER (EMERGENCY)
Facility: HOSPITAL | Age: 40
Discharge: HOME | End: 2025-04-23
Payer: COMMERCIAL

## 2025-04-23 VITALS
TEMPERATURE: 98.4 F | OXYGEN SATURATION: 95 % | HEART RATE: 82 BPM | BODY MASS INDEX: 38.66 KG/M2 | DIASTOLIC BLOOD PRESSURE: 71 MMHG | SYSTOLIC BLOOD PRESSURE: 137 MMHG | HEIGHT: 62 IN | RESPIRATION RATE: 15 BRPM | WEIGHT: 210.1 LBS

## 2025-04-23 DIAGNOSIS — M79.641 PAIN OF RIGHT HAND: ICD-10-CM

## 2025-04-23 PROCEDURE — 96372 THER/PROPH/DIAG INJ SC/IM: CPT

## 2025-04-23 PROCEDURE — 250N000012 HC RX MED GY IP 250 OP 636 PS 637

## 2025-04-23 PROCEDURE — 99284 EMERGENCY DEPT VISIT MOD MDM: CPT

## 2025-04-23 PROCEDURE — 250N000011 HC RX IP 250 OP 636: Mod: JZ

## 2025-04-23 RX ORDER — PREDNISONE 20 MG/1
TABLET ORAL
Qty: 10 TABLET | Refills: 0 | Status: SHIPPED | OUTPATIENT
Start: 2025-04-23

## 2025-04-23 RX ORDER — PREDNISONE 20 MG/1
40 TABLET ORAL ONCE
Status: COMPLETED | OUTPATIENT
Start: 2025-04-23 | End: 2025-04-23

## 2025-04-23 RX ORDER — KETOROLAC TROMETHAMINE 30 MG/ML
30 INJECTION, SOLUTION INTRAMUSCULAR; INTRAVENOUS ONCE
Status: COMPLETED | OUTPATIENT
Start: 2025-04-23 | End: 2025-04-23

## 2025-04-23 RX ADMIN — PREDNISONE 40 MG: 20 TABLET ORAL at 23:13

## 2025-04-23 RX ADMIN — KETOROLAC TROMETHAMINE 30 MG: 30 INJECTION, SOLUTION INTRAMUSCULAR at 23:15

## 2025-04-23 ASSESSMENT — COLUMBIA-SUICIDE SEVERITY RATING SCALE - C-SSRS
6. HAVE YOU EVER DONE ANYTHING, STARTED TO DO ANYTHING, OR PREPARED TO DO ANYTHING TO END YOUR LIFE?: YES
2. HAVE YOU ACTUALLY HAD ANY THOUGHTS OF KILLING YOURSELF IN THE PAST MONTH?: NO
1. IN THE PAST MONTH, HAVE YOU WISHED YOU WERE DEAD OR WISHED YOU COULD GO TO SLEEP AND NOT WAKE UP?: NO

## 2025-04-24 NOTE — ED PROVIDER NOTES
Emergency Department Encounter   NAME: Katelynn Cronin  AGE: 39 year old female   YOB: 1985 ;   MRN: [id number] ;    ED PROVIDER: Lashae Barajas PA-C    PCP: Lokesh Logan    Evaluation Date & Time:   4/24/25 2006    CHIEF COMPLAINT:  Hand Pain      FINAL IMPRESSION:    ICD-10-CM    1. Pain of right hand  M79.641 Orthopedic  Referral            IMPRESSION AND PLAN   MDM: Katelynn Cronin is a 39 year old female with a pertinent history of ELISA, alcohol abuse, HLD, eczema, MDD, vitamin D deficiency who presents to the ED by walk-in for evaluation of right hand pain, numbness and tingling x 2 weeks that have been progressively worsening over the last 2 days.  Patient has been taking a few tabs of Tylenol and Aleve with only temporary relief in her symptoms.    Vitals stable. On exam patient is well-appearing no acute distress.  There is mild swelling to the dorsal aspect of the right hand.  Active ROM of digits and wrist intact although there is movement pain.  No obvious lesions, lacerations or deformities appreciated.  Strength and sensation of all fingers intact.  Low suspicion for septic joint given absence of erythema and warmth.  Patient does not have a history of gout or diabetes. I do not feel that blood work is necessary at this time. Patient does endorse numbness and tingling specifically to the first, second and third fingers on the right hand.  This is consistent with median nerve innervation which could suggest carpal tunnel syndrome.  However, will order x-ray to rule out any underlying bony abnormalities.     XR negative for any acute fractures or dislocations, no evidence of osteoarthritis.  I do suspect patient could be experiencing pain from carpal tunnel syndrome.  I recommended she continue to wear her wrist brace every night and during the day, especially while she is at work.  I also prescribed her a short course of prednisone to help with the nerve irritation.  I  "recommended naproxen and provided a referral for orthopedics to discuss additional management including steroid injections and possible surgery.  Patient overall expresses her understanding.  All questions and concerns addressed. Patient is overall agreeable to this plan and feels comfortable with discharge at this time.      MIPS (CTPE, Dental pain, Pat, Sinusitis, Asthma/COPD, Head Trauma): Not Applicable    SEPSIS: None      ED COURSE:  8:59 PM I met and introduced myself to the patient. I gathered initial history and performed my physical exam. We discussed plan for initial workup.   10:34 PM I rechecked the patient and discussed results, discharge, follow up, and reasons to return to the ED.           MEDICATIONS GIVEN IN THE EMERGENCY DEPARTMENT:  Medications   ketorolac (TORADOL) injection 30 mg (30 mg Intramuscular $Given 4/23/25 3817)   predniSONE (DELTASONE) tablet 40 mg (40 mg Oral $Given 4/23/25 4146)         NEW PRESCRIPTIONS STARTED AT TODAY'S ED VISIT:  Discharge Medication List as of 4/23/2025 11:20 PM            BRIEF HPI   Patient information was obtained from: the patient    Use of Intrepreter: N/A     Katelynn Cronin is a 39 year old female with no pertinent medical history who presents to the ED by walking for evaluation of hand pain.   For the past two weeks, the patient has been experiencing \"sharp\" right hand pain and limited range of motion with numbness and tingling that has worsened in the last two days. The patient expresses that the pain is specifically in her right middle and ring finger. The patient reports normally wearing a brace to the right hand but has been taking a break to see if the prolonged use is at all related to the onset of pain. She reports self administering alternating hot and cold compresses, tylenol,  and aleve pain medication that brought about slight relief of her symptoms. The pain is exacerbated by movement. Patient denies history of GOUT, acute injury to the " "hand.      REVIEW OF SYSTEMS:  Pertinent positive and negative symptoms per HPI.       MEDICAL HISTORY     Past Medical History:   Diagnosis Date    Acute midline low back pain without sciatica 2/10/2019    Alcohol abuse 8/31/2018    Allergic rhinitis 5/7/2014    Amblyopia 7/8/2010    Anxiety 2/29/2012    MCAARIO III (cervical intraepithelial neoplasia grade III) with severe dysplasia 10/29/2013    Dysthymic disorder 1/25/2013    Eczema 2/19/2015    Hypercholesterolemia 2/19/2015    Lateral epicondylitis of right elbow 8/28/2014    Moderate episode of recurrent major depressive disorder (H) 3/4/2019    Overweight 6/29/2009    PPH (postpartum hemorrhage) 3/13/2009    Vitamin D deficiency 6/13/2014    Vocal cord dysfunction 6/15/2014       Past Surgical History:   Procedure Laterality Date    COLPOSCOPY CERVIX, LOOP ELECTRODE BIOPSY, COMBINED      wisdom teeth Bilateral        No family history on file.    Social History     Tobacco Use    Smoking status: Never    Smokeless tobacco: Never   Substance Use Topics    Alcohol use: Yes    Drug use: Not Currently         PHYSICAL EXAM     First Vitals:  Patient Vitals for the past 24 hrs:   BP Temp Temp src Pulse Resp SpO2 Height Weight   04/23/25 2024 137/71 98.4  F (36.9  C) Oral 82 15 95 % 1.575 m (5' 2\") 95.3 kg (210 lb 1.6 oz)       PHYSICAL EXAM:  Physical Exam  Vitals and nursing note reviewed.   Constitutional:       General: She is not in acute distress.     Appearance: Normal appearance. She is normal weight. She is not ill-appearing or diaphoretic.   HENT:      Head: Normocephalic and atraumatic.      Mouth/Throat:      Mouth: Mucous membranes are moist.   Eyes:      Conjunctiva/sclera: Conjunctivae normal.   Cardiovascular:      Rate and Rhythm: Normal rate.      Pulses:           Radial pulses are 2+ on the right side and 2+ on the left side.   Pulmonary:      Effort: Pulmonary effort is normal.   Musculoskeletal:      Right wrist: Normal.      Right hand: Swelling " and tenderness present. No deformity or lacerations. Decreased range of motion. Normal strength. Normal sensation. Normal pulse.      Left hand: Normal.      Cervical back: Neck supple.   Skin:     General: Skin is warm and dry.   Neurological:      General: No focal deficit present.      Mental Status: She is alert and oriented to person, place, and time.   Psychiatric:         Mood and Affect: Mood normal.          RESULTS     LAB:  Labs Ordered and Resulted from Time of ED Arrival to Time of ED Departure - No data to display      RADIOLOGY:  XR Wrist Right G/E 3 Views   Final Result   IMPRESSION:       Negative for acute right hand or right wrist fracture or malalignment. Normal joint spacing. No osseous erosions.      XR Hand Right G/E 3 Views   Final Result   IMPRESSION:       Negative for acute right hand or right wrist fracture or malalignment. Normal joint spacing. No osseous erosions.              I, Rivka Rodriguez , am serving as a scribe to document services personally performed by Lashae Barajas PA-C, based on my observation and the provider's statements to me. ILashae PA-C attest that Rivka Rodriguez  is acting in a scribe capacity, has observed my performance of the services and has documented them in accordance with my direction.       Lashae Barajas PA-C  Emergency Medicine   [facility] EMERGENCY DEPARTMENT       Lashae Barajas PA-C  04/24/25 0012

## 2025-04-24 NOTE — ED TRIAGE NOTES
Patient arrives to triage from home with chief complaint of right hand pain for about two weeks now.  Patient denies any injury to hand and believes it may be a carpel tunnel flare.  Took tylenol last around 1400 today and Aleve patient took about 30 minutes ago.  Endorses generalized swelling, but limited ROM in fingers.  Alert and oriented x4.       C/o tingling and numbness as well for about a week and half as well. Radial pulse present.   
Yes

## 2025-04-24 NOTE — TELEPHONE ENCOUNTER
Action April 24, 2025 9:58 AM MT   Action Taken Sent a request for imaging from [facility].        DIAGNOSIS: RIGHT HAND PAIN   APPOINTMENT DATE: 04/28/2025   NOTES STATUS DETAILS   DISCHARGE REPORT from the ER Internal        Care Everywhere 4/23/2025 - [facility]    10/15/2022 - [facility]    10/25/2017 - [facility] [place]    5/15/2014 - [facility] [place]   ULTRASOUND PACS Internal   XRAYS (IMAGES & REPORTS) PACS Internal    [facility]:  05/22/2023 - RT Elbow  02/18/2013 - RT Finger

## 2025-04-24 NOTE — DISCHARGE INSTRUCTIONS
Your x-ray was negative for arthritis or bony abnormalities. I suspect your symptoms are related to carpal tunnel. I recommend increased use of the wrist brace, especially at night and at work. Continue tylenol and ibuprofen for pain. I will prescribe a short course of steroids for suspected nerve irritation. I will also provide a referral to orthopedics to discuss additional management including steroid injections and surgery. Please return to the ED for any new or worsening symptoms.

## 2025-04-28 ENCOUNTER — OFFICE VISIT (OUTPATIENT)
Dept: NEUROLOGY | Facility: CLINIC | Age: 40
End: 2025-04-28
Attending: STUDENT IN AN ORGANIZED HEALTH CARE EDUCATION/TRAINING PROGRAM
Payer: COMMERCIAL

## 2025-04-28 ENCOUNTER — OFFICE VISIT (OUTPATIENT)
Dept: ORTHOPEDICS | Facility: CLINIC | Age: 40
End: 2025-04-28
Payer: COMMERCIAL

## 2025-04-28 ENCOUNTER — PRE VISIT (OUTPATIENT)
Dept: ORTHOPEDICS | Facility: CLINIC | Age: 40
End: 2025-04-28

## 2025-04-28 VITALS — BODY MASS INDEX: 38.64 KG/M2 | WEIGHT: 210 LBS | HEIGHT: 62 IN

## 2025-04-28 DIAGNOSIS — G56.01 CARPAL TUNNEL SYNDROME ON RIGHT: ICD-10-CM

## 2025-04-28 DIAGNOSIS — G56.01 CARPAL TUNNEL SYNDROME ON RIGHT: Primary | ICD-10-CM

## 2025-04-28 DIAGNOSIS — M79.641 PAIN OF RIGHT HAND: ICD-10-CM

## 2025-04-28 PROCEDURE — 1125F AMNT PAIN NOTED PAIN PRSNT: CPT | Performed by: STUDENT IN AN ORGANIZED HEALTH CARE EDUCATION/TRAINING PROGRAM

## 2025-04-28 PROCEDURE — 99204 OFFICE O/P NEW MOD 45 MIN: CPT | Mod: 25 | Performed by: STUDENT IN AN ORGANIZED HEALTH CARE EDUCATION/TRAINING PROGRAM

## 2025-04-28 PROCEDURE — 95886 MUSC TEST DONE W/N TEST COMP: CPT | Performed by: PSYCHIATRY & NEUROLOGY

## 2025-04-28 PROCEDURE — 95909 NRV CNDJ TST 5-6 STUDIES: CPT | Performed by: PSYCHIATRY & NEUROLOGY

## 2025-04-28 PROCEDURE — 20526 THER INJECTION CARP TUNNEL: CPT | Mod: RT | Performed by: STUDENT IN AN ORGANIZED HEALTH CARE EDUCATION/TRAINING PROGRAM

## 2025-04-28 RX ORDER — LIDOCAINE HYDROCHLORIDE 10 MG/ML
2 INJECTION, SOLUTION EPIDURAL; INFILTRATION; INTRACAUDAL; PERINEURAL
Status: COMPLETED | OUTPATIENT
Start: 2025-04-28 | End: 2025-04-28

## 2025-04-28 RX ORDER — DEXAMETHASONE SODIUM PHOSPHATE 4 MG/ML
4 INJECTION, SOLUTION INTRA-ARTICULAR; INTRALESIONAL; INTRAMUSCULAR; INTRAVENOUS; SOFT TISSUE
Status: COMPLETED | OUTPATIENT
Start: 2025-04-28 | End: 2025-04-28

## 2025-04-28 RX ADMIN — DEXAMETHASONE SODIUM PHOSPHATE 4 MG: 4 INJECTION, SOLUTION INTRA-ARTICULAR; INTRALESIONAL; INTRAMUSCULAR; INTRAVENOUS; SOFT TISSUE at 11:23

## 2025-04-28 RX ADMIN — LIDOCAINE HYDROCHLORIDE 2 ML: 10 INJECTION, SOLUTION EPIDURAL; INFILTRATION; INTRACAUDAL; PERINEURAL at 11:23

## 2025-04-28 NOTE — LETTER
4/28/2025      Katelynn Cronin  [address] [address]      Dear Colleague,    Thank you for referring your patient, Katelynn Cronin, to the [facility] [place]. Please see a copy of my visit note below.    Orthopaedic Surgery Hand and Upper Extremity Clinic H&P Note:  Date: Apr 28, 2025     Patient Name: Kaetlynn Cronin  MRN: [id number]    Consult requested by: Lashae Barajas    CHIEF COMPLAINT: Right hand pain, numbness, tingling    Dominant Hand: Right  Occupation: /      HPI:  Ms. Katelynn Cronin is a 39 year old  female right hand dominant who presents with right hand numbness, tingling, and pain.  Ongoing for about 3 weeks.  No injury or trauma.  Symptoms mostly affecting the thumb, index, middle finger but also spreading to the ring finger.  Small finger less affected.  He also endorses muscle tightness at the volar forearm and anterior bicep.  Symptoms occur throughout the day and at night.  Worse at night.  She thinks that cleaning the glass in the mornings at work aggravates symptoms.  She is using a brace at night and throughout the day.  The patient recently did go to the emergency department 4/23/2025 due to worsening symptoms.  The patient was provided with oral prednisone which did help the symptoms a little bit.  Also using Tylenol, Aleve, ibuprofen.    PAST MEDICAL HISTORY:  Past Medical History:   Diagnosis Date     Acute midline low back pain without sciatica 2/10/2019    Formatting of this note might be different from the original. Slipped and fell directly onto her back on 2/4/2019. Was seen at  ER on 2/4/2019 and lumbar spine x-rays showed an age indeterminate 20% compression of L2 (but patient was not found to have tenderness to palpation over L2 on clinical exam in the ER), and lumbar spine, pelvis and left hip x-rays were otherwise unremarkable     Alcohol abuse 8/31/2018    Formatting of this note might be different  from the original. [facility] ER visit on 6/29/2018 for panic attack with alcohol intoxication (blood alcohol level 0.125).  Hospitalization at [facility] 8/13-8/20/2018 for alcohol intoxication (MILTON 0.16) and acetaminophen overdose. Patient was voluntarily admitted to the inpatient psychiatric unit for observation and treatment following medi     Allergic rhinitis 5/7/2014    Formatting of this note might be different from the original. Her symptoms are worst during the Spring (?) Skin Prick Testing was performed on: 6/13/2014  Sensitive to: English Plantain & Molds     Amblyopia 7/8/2010    Formatting of this note might be different from the original. Right eye.     Anxiety 2/29/2012    Formatting of this note might be different from the original. Zoloft started 2/29/12.  Switched to Effexor XR 1/15/2014.     MACARIO III (cervical intraepithelial neoplasia grade III) with severe dysplasia 10/29/2013    Formatting of this note might be different from the original. 09/04/2012 ASCUSHPV+ 10/02/2012 [place] MACARIO I 09/10/2013 [facility]/ASCUS/HPV+ 10/10/2013 [place] MACARIO II 10/29/2013 [facility] MACARIO II-III, Negative margins 01/29/2015 NIL/HPV negative 03/06/2015 NIL/HPV negative 05/17/2016 NIL/HPV negative 02/28/2019 NIL/HPV negative  ASCCP recommends:  History of CIN2 - CIN3:  Pap and HPV every 3 years for 20 years.   Pl     Dysthymic disorder 1/25/2013     Eczema 2/19/2015     Hypercholesterolemia 2/19/2015    Formatting of this note might be different from the original. Mildly elevated total and LDL cholesterol levels, along with a low HDL level, noted 2/19/2015.     Lateral epicondylitis of right elbow 8/28/2014     Moderate episode of recurrent major depressive disorder (H) 3/4/2019    Formatting of this note might be different from the original. Zoloft started 2/29/12. Previously was on medication from 9668-4696, and briefly again in 2009 for postpartum depression. Effexor XR started on 1/15/2014. She is also following with  Psychology for counseling therapy.     Overweight 6/29/2009     PPH (postpartum hemorrhage) 3/13/2009     Vitamin D deficiency 6/13/2014    Formatting of this note might be different from the original. Treatment started 6/13/2014 with Vitamin D 5,000 units daily per Allergy specialist.     Vocal cord dysfunction 6/15/2014    Formatting of this note might be different from the original. Diagnosed by Allergy specialist 6/13/2014, and treatment with Speech Therapy offered if the patient wishes to pursue this.       PAST SURGICAL HISTORY:  Past Surgical History:   Procedure Laterality Date     COLPOSCOPY CERVIX, LOOP ELECTRODE BIOPSY, COMBINED       wisdom teeth Bilateral        MEDICATIONS:  Current Outpatient Medications   Medication Sig Dispense Refill     alum & mag hydroxide-simethicone (MAALOX MAX) 400-400-40 MG/5ML SUSP suspension Take 15 mLs by mouth every 4 hours as needed for indigestion. 100 mL 0     cetirizine (ZYRTEC) 10 MG tablet [CETIRIZINE (ZYRTEC) 10 MG TABLET] Take 10 mg by mouth.       diazepam (VALIUM) 5 MG tablet Take 1 tablet (5 mg) by mouth every 6 hours as needed for muscle spasms 5 tablet 0     gabapentin (NEURONTIN) 100 MG capsule [GABAPENTIN (NEURONTIN) 100 MG CAPSULE] Take 100 mg by mouth.       hydrOXYzine (VISTARIL) 50 MG capsule Take 1 capsule (50 mg) by mouth 3 times daily as needed for anxiety 20 capsule 0     ibuprofen (ADVIL/MOTRIN) 600 MG tablet Take 1 tablet (600 mg) by mouth every 6 hours as needed for moderate pain 16 tablet 0     meclizine (ANTIVERT) 25 MG tablet Take 1 tablet (25 mg) by mouth 3 times daily as needed for dizziness 20 tablet 0     methocarbamol (ROBAXIN) 750 MG tablet Take 1-2 tablets (750-1,500 mg) by mouth 4 times daily as needed for muscle spasms 30 tablet 0     naproxen (NAPROSYN) 500 MG tablet [NAPROXEN (NAPROSYN) 500 MG TABLET] Take 500 mg by mouth.       ondansetron (ZOFRAN ODT) 4 MG ODT tab Take 1 tablet (4 mg) by mouth every 6 hours as needed for nausea or  "vomiting 20 tablet 0     predniSONE (DELTASONE) 20 MG tablet Take two tablets (= 40mg) each day for 5 (five) days 10 tablet 0     QUEtiapine (SEROQUEL) 50 MG tablet [QUETIAPINE (SEROQUEL) 50 MG TABLET] Take 50 mg by mouth.       selenium sulfide 2.25 % Sham [SELENIUM SULFIDE 2.25 % SHAM] Apply 6 mL topically daily. 180 mL 0     venlafaxine (EFFEXOR-XR) 150 MG 24 hr capsule [VENLAFAXINE (EFFEXOR-XR) 150 MG 24 HR CAPSULE] TAKE 2 CAPSULES BY MOUTH EVERY DAY WITH A MEAL       No current facility-administered medications for this visit.       ALLERGIES:     Allergies   Allergen Reactions     Grass Extracts [Gramineae Pollens] Rash and Swelling     Swelling, itching, and no voice   Swelling, itching, and no voice        Latex Rash     Pollen Extracts [Pollen Extract] Rash     Swelling, itching, and no voice        FAMILY HISTORY:  No pertinent family history    SOCIAL HISTORY:  Social History     Tobacco Use     Smoking status: Never     Smokeless tobacco: Never   Substance Use Topics     Alcohol use: Yes     Drug use: Not Currently       The patient's past medical, family, and social history was reviewed and confirmed.    REVIEW OF SYMPTOMS:      General: Negative   Eyes: Negative   Ear, Nose and Throat: Negative   Respiratory: Negative   Cardiovascular: Negative   Gastrointestinal: Negative   Genito-urinary: Negative   Musculoskeletal: see HPI  Neurological: Negative   Psychological: Negative  HEME: Negative   ENDO: Negative   SKIN: Negative    VITALS:  Vitals:    04/28/25 1047   Weight: 95.3 kg (210 lb)   Height: 1.575 m (5' 2\")       EXAM:  General: NAD, A&Ox3  HEENT: NC/AT  CV: RRR by peripheral pulse  Pulmonary: Non-labored breathing on RA  RUE:  Skin intact, no deformity, no atrophy  Diminished sensation to light touch in the median nerve distribution, intact radial and ulnar  2-point discrimination 6 mm median and ulnar  Tenderness at the carpal tunnel, positive Tinel's and Durkan's compression test  Negative " Tinel's at the cubital tunnel  Tenderness over the course of the median nerve in the volar forearm, at the lacertus, and along the medial upper arm  Intact EPL, FPL, APB, intrinsics  WWP, cap refill less than 2 seconds       IMAGING:    X-rays right wrist and hand 4/23/2025 without significant bony abnormality    I have personally reviewed the above images and labs.         IMPRESSION AND RECOMMENDATIONS:  Ms. Katelynn Cronin is a 39 year old female right hand dominant with right carpal tunnel syndrome.    I discussed the diagnosis, prognosis, and treatment options.  Given the acuity of her symptoms, I did offer the patient a steroid injection to try to calm symptoms.  She wished to proceed.  Right carpal tunnel injection with 1 cc dexamethasone and 2 cc 1% lidocaine performed today.  Patient tolerated this well without complication.    Given the radiating symptoms proximally, I did recommend an EMG/NCS to further evaluate.  This has been ordered.    Recommended that she continue using the wrist brace, particularly at night.  Continue over-the-counter anti-inflammatories    5 pound weightbearing restriction given for work.    Follow-up with me after EMG/NCS to discuss results and further treatment.  Might be a candidate for repeat injection versus surgical discussion.    Yonis Alegre MD    Hand, Upper Extremity & Microvascular Surgery  Department of Orthopaedic Surgery  [facility]          Hand / Upper Extremity Injection/Arthrocentesis: R carpal tunnel    Date/Time: 4/28/2025 11:23 AM    Performed by: Yonis Alegre MD  Authorized by: Yonis Alegre MD    Indications:  Pain  Needle Size:  25 G  Guidance: landmark    Condition: carpal tunnel      Site:  R carpal tunnel  Medications:  4 mg dexAMETHasone 4 MG/ML; 2 mL lidocaine (PF) 1 %  Outcome:  Tolerated well, no immediate complications  Procedure discussed: discussed risks, benefits, and alternatives    Consent Given by:   Patient  Timeout: timeout called immediately prior to procedure    Prep: patient was prepped and draped in usual sterile fashion        [facility] [place] [id number] [facility]  [address] [facility] [id number] [phone]  Dept: [phone]  ______________________________________________________________________________    Patient: Katelynn Cronin   : 1985   MRN: [id number]   2025    INVASIVE PROCEDURE SAFETY CHECKLIST    Date: 2025   Procedure:Right wrist carpal tunnel steroid injection  Patient Name: Katelynn Cronin  MRN: [id number]  YOB: 1985    Action: Complete sections as appropriate. Any discrepancy results in a HARD COPY until resolved.     PRE PROCEDURE:  Patient ID verified with 2 identifiers (name and  or MRN): Yes  Procedure and site verified with patient/designee (when able): Yes  Accurate consent documentation in medical record: Yes  H&P (or appropriate assessment) documented in medical record: Yes  H&P must be up to 20 days prior to procedure and updates within 24 hours of procedure as applicable: Yes  Relevant diagnostic and radiology test results appropriately labeled and displayed as applicable: NA  Procedure site(s) marked with provider initials: NA    TIMEOUT:  Time-Out performed immediately prior to starting procedure, including verbal and active participation of all team members addressing the following:Yes  * Correct patient identify  * Confirmed that the correct side and site are marked  * An accurate procedure consent form  * Agreement on the procedure to be done  * Correct patient position  * Relevant images and results are properly labeled and appropriately displayed  * The need to administer antibiotics or fluids for irrigation purposes during the procedure as applicable   * Safety precautions based on patient history or medication use    DURING PROCEDURE: Verification of correct person, site, and procedures any  time the responsibility for care of the patient is transferred to another member of the care team.       The following medications were given:         Prior to injection, verified patient identity using patient's name and date of birth.  Due to injection administration, patient instructed to remain in clinic for 15 minutes  afterwards, and to report any adverse reaction to me immediately.    Tendon sheath injection was performed.   Medication Name: Dexamethasone Sodium Phosphate  4mg/mL NDC 86549-455-54  Drug Amount Wasted:  None.  Vial/Syringe: Single dose vial  Expiration Date:  05/01/26    Medication Name Lidocaine 1% NDC 87679-377-68    Scribed by HELDER WARREN, ATC for Dr. Alegre on April 28, 2025 at 10/1/28  based on the provider's statements to me.     HELDER WARREN, ATC    Again, thank you for allowing me to participate in the care of your patient.        Sincerely,        Yonis Alegre MD    Electronically signed

## 2025-04-28 NOTE — NURSING NOTE
"Reason For Visit:   Chief Complaint   Patient presents with    Consult     Pain of right hand        Primary MD: Lokesh Logan  Ref. MD: Ale    Age: 39 year old    ?  No      Ht 1.575 m (5' 2\")   Wt 95.3 kg (210 lb)   BMI 38.41 kg/m        Pain Assessment  Patient Currently in Pain: Yes  0-10 Pain Scale: 10  Primary Pain Location: Wrist (Right wrist/hand)  Pain Descriptors: Intermittent, Sore, Shooting, Numbness, Tingling (Pain can interrupt sleep)    Hand Dominance Evaluation  Hand Dominance: Right          QuickDASH Assessment      4/25/2025     2:22 PM   QuickDASH Main   1. Open a tight or new jar Moderate difficulty   2. Do heavy household chores (e.g., wash walls, floors) Moderate difficulty   3. Carry a shopping bag or briefcase Moderate difficulty   4. Wash your back Unable to answer   5. Use a knife to cut food Mild difficulty   6. Recreational activities in which you take some force or impact through your arm, shoulder or hand (e.g., golf, hammering, tennis, etc.) Unable to answer   7. During the past week, to what extent has your arm, shoulder or hand problem interfered with your normal social activities with family, friends, neighbours or groups Quite a bit   8. During the past week, were you limited in your work or other regular daily activities as a result of your arm, shoulder or hand problem Moderately limited   9. Arm, shoulder or hand pain Severe   10.Tingling (pins and needles) in your arm,shoulder or hand Severe   11. During the past week, how much difficulty have you had sleeping because of the pain in your arm, shoulder or hand Moderate difficulty   Quickdash Ability Score 40.91          Current Outpatient Medications   Medication Sig Dispense Refill    alum & mag hydroxide-simethicone (MAALOX MAX) 400-400-40 MG/5ML SUSP suspension Take 15 mLs by mouth every 4 hours as needed for indigestion. 100 mL 0    cetirizine (ZYRTEC) 10 MG tablet [CETIRIZINE (ZYRTEC) 10 MG TABLET] Take 10 mg " by mouth.      diazepam (VALIUM) 5 MG tablet Take 1 tablet (5 mg) by mouth every 6 hours as needed for muscle spasms 5 tablet 0    gabapentin (NEURONTIN) 100 MG capsule [GABAPENTIN (NEURONTIN) 100 MG CAPSULE] Take 100 mg by mouth.      hydrOXYzine (VISTARIL) 50 MG capsule Take 1 capsule (50 mg) by mouth 3 times daily as needed for anxiety 20 capsule 0    ibuprofen (ADVIL/MOTRIN) 600 MG tablet Take 1 tablet (600 mg) by mouth every 6 hours as needed for moderate pain 16 tablet 0    meclizine (ANTIVERT) 25 MG tablet Take 1 tablet (25 mg) by mouth 3 times daily as needed for dizziness 20 tablet 0    methocarbamol (ROBAXIN) 750 MG tablet Take 1-2 tablets (750-1,500 mg) by mouth 4 times daily as needed for muscle spasms 30 tablet 0    naproxen (NAPROSYN) 500 MG tablet [NAPROXEN (NAPROSYN) 500 MG TABLET] Take 500 mg by mouth.      ondansetron (ZOFRAN ODT) 4 MG ODT tab Take 1 tablet (4 mg) by mouth every 6 hours as needed for nausea or vomiting 20 tablet 0    predniSONE (DELTASONE) 20 MG tablet Take two tablets (= 40mg) each day for 5 (five) days 10 tablet 0    QUEtiapine (SEROQUEL) 50 MG tablet [QUETIAPINE (SEROQUEL) 50 MG TABLET] Take 50 mg by mouth.      selenium sulfide 2.25 % Sham [SELENIUM SULFIDE 2.25 % SHAM] Apply 6 mL topically daily. 180 mL 0    venlafaxine (EFFEXOR-XR) 150 MG 24 hr capsule [VENLAFAXINE (EFFEXOR-XR) 150 MG 24 HR CAPSULE] TAKE 2 CAPSULES BY MOUTH EVERY DAY WITH A MEAL         Allergies   Allergen Reactions    Grass Extracts [Gramineae Pollens] Rash and Swelling     Swelling, itching, and no voice   Swelling, itching, and no voice       Latex Rash    Pollen Extracts [Pollen Extract] Rash     Swelling, itching, and no voice        Tereza Lujan, ATC

## 2025-04-28 NOTE — PROGRESS NOTES
[facility] Extrem[facility] H&P Note:  Date: Apr 28, 2025     Patient Name: Katelynn Cronin  MRN: [id number]    Consult requested by: Lashae Barajas    CHIEF COMPLAINT: Right hand pain, numbness, tingling    Dominant Hand: Right  Occupation: /      HPI:  Ms. Katelynn Cronin is a 39 year old  female right hand dominant who presents with right hand numbness, tingling, and pain.  Ongoing for about 3 weeks.  No injury or trauma.  Symptoms mostly affecting the thumb, index, middle finger but also spreading to the ring finger.  Small finger less affected.  He also endorses muscle tightness at the volar forearm and anterior bicep.  Symptoms occur throughout the day and at night.  Worse at night.  She thinks that cleaning the glass in the mornings at work aggravates symptoms.  She is using a brace at night and throughout the day.  The patient recently did go to the emergency department 4/23/2025 due to worsening symptoms.  The patient was provided with oral prednisone which did help the symptoms a little bit.  Also using Tylenol, Aleve, ibuprofen.    PAST MEDICAL HISTORY:  Past Medical History:   Diagnosis Date    Acute midline low back pain without sciatica 2/10/2019    Formatting of this note might be different from the original. Slipped and fell directly onto her back on 2/4/2019. Was seen at [facility] ER on 2/4/2019 and lumbar spine x-rays showed an age indeterminate 20% compression of L2 (but patient was not found to have tenderness to palpation over L2 on clinical exam in the ER), and lumbar spine, pelvis and left hip x-rays were otherwise unremarkable    Alcohol abuse 8/31/2018    Formatting of this note might be different from the original. [facility] ER visit on 6/29/2018 for panic attack with alcohol intoxication (blood alcohol level 0.125).  Hospitalization at [facility] 8/13-8/20/2018 for alcohol intoxication (MILTON 0.16) and acetaminophen overdose. Patient  was voluntarily admitted to the inpatient psychiatric unit for observation and treatment following medi    Allergic rhinitis 5/7/2014    Formatting of this note might be different from the original. Her symptoms are worst during the Spring (?) Skin Prick Testing was performed on: 6/13/2014  Sensitive to: English Plantain & Molds    Amblyopia 7/8/2010    Formatting of this note might be different from the original. Right eye.    Anxiety 2/29/2012    Formatting of this note might be different from the original. Zoloft started 2/29/12.  Switched to Effexor XR 1/15/2014.    MACARIO III (cervical intraepithelial neoplasia grade III) with severe dysplasia 10/29/2013    Formatting of this note might be different from the original. 09/04/2012 [facility]USHPV+ 10/02/2012 [facility] MACARIO I 09/10/2013 [facility]/ASCUS/HPV+ 10/10/2013 [facility] MACARIO II 10/29/2013 [facility] MACARIO II-III, Negative margins 01/29/2015 NIL/HPV negative 03/06/2015 NIL/HPV negative 05/17/2016 NIL/HPV negative 02/28/2019 NIL/HPV negative  AS[facility]P recommends:  History of CIN2 - CIN3:  Pap and HPV every 3 years for 20 years.   Pl    Dysthymic disorder 1/25/2013    Eczema 2/19/2015    Hypercholesterolemia 2/19/2015    Formatting of this note might be different from the original. Mildly elevated total and LDL cholesterol levels, along with a low HDL level, noted 2/19/2015.    Lateral epicondylitis of right elbow 8/28/2014    Moderate episode of recurrent major depressive disorder (H) 3/4/2019    Formatting of this note might be different from the original. Zoloft started 2/29/12. Previously was on medication from 5237-6382, and briefly again in 2009 for postpartum depression. Effexor XR started on 1/15/2014. She is also following with Psychology for counseling therapy.    Overweight 6/29/2009    PPH (postpartum hemorrhage) 3/13/2009    Vitamin D deficiency 6/13/2014    Formatting of this note might be different from the original. Treatment started 6/13/2014 with Vitamin D 5,000 units daily per  Allergy specialist.    Vocal cord dysfunction 6/15/2014    Formatting of this note might be different from the original. Diagnosed by Allergy specialist 6/13/2014, and treatment with Speech Therapy offered if the patient wishes to pursue this.       PAST SURGICAL HISTORY:  Past Surgical History:   Procedure Laterality Date    COLPOSCOPY CERVIX, LOOP ELECTRODE BIOPSY, COMBINED      wisdom teeth Bilateral        MEDICATIONS:  Current Outpatient Medications   Medication Sig Dispense Refill    alum & mag hydroxide-simethicone (MAALOX MAX) 400-400-40 MG/5ML SUSP suspension Take 15 mLs by mouth every 4 hours as needed for indigestion. 100 mL 0    cetirizine (ZYRTEC) 10 MG tablet [CETIRIZINE (ZYRTEC) 10 MG TABLET] Take 10 mg by mouth.      diazepam (VALIUM) 5 MG tablet Take 1 tablet (5 mg) by mouth every 6 hours as needed for muscle spasms 5 tablet 0    gabapentin (NEURONTIN) 100 MG capsule [GABAPENTIN (NEURONTIN) 100 MG CAPSULE] Take 100 mg by mouth.      hydrOXYzine (VISTARIL) 50 MG capsule Take 1 capsule (50 mg) by mouth 3 times daily as needed for anxiety 20 capsule 0    ibuprofen (ADVIL/MOTRIN) 600 MG tablet Take 1 tablet (600 mg) by mouth every 6 hours as needed for moderate pain 16 tablet 0    meclizine (ANTIVERT) 25 MG tablet Take 1 tablet (25 mg) by mouth 3 times daily as needed for dizziness 20 tablet 0    methocarbamol (ROBAXIN) 750 MG tablet Take 1-2 tablets (750-1,500 mg) by mouth 4 times daily as needed for muscle spasms 30 tablet 0    naproxen (NAPROSYN) 500 MG tablet [NAPROXEN (NAPROSYN) 500 MG TABLET] Take 500 mg by mouth.      ondansetron (ZOFRAN ODT) 4 MG ODT tab Take 1 tablet (4 mg) by mouth every 6 hours as needed for nausea or vomiting 20 tablet 0    predniSONE (DELTASONE) 20 MG tablet Take two tablets (= 40mg) each day for 5 (five) days 10 tablet 0    QUEtiapine (SEROQUEL) 50 MG tablet [QUETIAPINE (SEROQUEL) 50 MG TABLET] Take 50 mg by mouth.      selenium sulfide 2.25 % Sham [SELENIUM SULFIDE 2.25 %  "SHAM] Apply 6 mL topically daily. 180 mL 0    venlafaxine (EFFEXOR-XR) 150 MG 24 hr capsule [VENLAFAXINE (EFFEXOR-XR) 150 MG 24 HR CAPSULE] TAKE 2 CAPSULES BY MOUTH EVERY DAY WITH A MEAL       No current facility-administered medications for this visit.       ALLERGIES:     Allergies   Allergen Reactions    Grass Extracts [Gramineae Pollens] Rash and Swelling     Swelling, itching, and no voice   Swelling, itching, and no voice       Latex Rash    Pollen Extracts [Pollen Extract] Rash     Swelling, itching, and no voice        FAMILY HISTORY:  No pertinent family history    SOCIAL HISTORY:  Social History     Tobacco Use    Smoking status: Never    Smokeless tobacco: Never   Substance Use Topics    Alcohol use: Yes    Drug use: Not Currently       The patient's past medical, family, and social history was reviewed and confirmed.    REVIEW OF SYMPTOMS:      General: Negative   Eyes: Negative   Ear, Nose and Throat: Negative   Respiratory: Negative   Cardiovascular: Negative   Gastrointestinal: Negative   Genito-urinary: Negative   Musculoskeletal: see HPI  Neurological: Negative   Psychological: Negative  HEME: Negative   ENDO: Negative   SKIN: Negative    VITALS:  Vitals:    04/28/25 1047   Weight: 95.3 kg (210 lb)   Height: 1.575 m (5' 2\")       EXAM:  General: NAD, A&Ox3  HEENT: NC/AT  CV: RRR by peripheral pulse  Pulmonary: Non-labored breathing on RA  RUE:  Skin intact, no deformity, no atrophy  Diminished sensation to light touch in the median nerve distribution, intact radial and ulnar  2-point discrimination 6 mm median and ulnar  Tenderness at the carpal tunnel, positive Tinel's and Durkan's compression test  Negative Tinel's at the cubital tunnel  Tenderness over the course of the median nerve in the volar forearm, at the lacertus, and along the medial upper arm  Intact EPL, FPL, APB, intrinsics  WWP, cap refill less than 2 seconds       IMAGING:    X-rays right wrist and hand 4/23/2025 without significant " bony abnormality    I have personally reviewed the above images and labs.         IMPRESSION AND RECOMMENDATIONS:  Ms. Katelynn Cronin is a 39 year old female right hand dominant with right carpal tunnel syndrome.    I discussed the diagnosis, prognosis, and treatment options.  Given the acuity of her symptoms, I did offer the patient a steroid injection to try to calm symptoms.  She wished to proceed.  Right carpal tunnel injection with 1 cc dexamethasone and 2 cc 1% lidocaine performed today.  Patient tolerated this well without complication.    Given the radiating symptoms proximally, I did recommend an EMG/NCS to further evaluate.  This has been ordered.    Recommended that she continue using the wrist brace, particularly at night.  Continue over-the-counter anti-inflammatories    5 pound weightbearing restriction given for work.    Follow-up with me after EMG/NCS to discuss results and further treatment.  Might be a candidate for repeat injection versus surgical discussion.    Yonis Alegre MD    Hand, Upper Extremity & Microvascular Surgery  Department of Orthopaedic Surgery  [facility]

## 2025-04-28 NOTE — LETTER
2025       RE: Katelynn Cronin  [address] [address]     Dear Colleague,    Thank you for referring your patient, Katelynn Cronin, to the [facility] [place] at [facility]. Please see a copy of my visit note below.                        [facility]  Electrodiagnostic Laboratory                 Department of Neurology                                                                                                         Test Date:  2025    Patient: Katelynn Cronin : 1985 Physician: Kraig Prather MD   Sex: Female AGE: 39 year Ref Phys: Yonis Alegre MD   ID#: [id number]   Technician: Kristy Behling     History and Examination:  39 year old with about 2 months of intermittent numbness and paresthesias in her right hand, become more intense over the last 3 weeks. This study is being performed to investigate for radiculopathy vs focal neuropathy.     Techniques:  Motor and sensory conduction studies were done with surface recording electrodes.  EMG was done with a concentric needle electrode.     Results  Nerve conduction studies:  1. Right median-D2 sensory response shows severely slowed CV and moderately reduced amplitude.   2. Right ulnar-D5 sensory response is normal.   3. Right median-APB motor response shows mildly prolonged DL, normal amplitude and normal CV.   4. Right ulnar-ADM motor responses is normal.   5. Right median-lumbrical vs ulnar-interosseous latency difference is prolonged.    Needle EMG of selected proximal and distal right upper limb muscles was performed as tabulated below. No abnormal spontaneous activity was observed in the sampled muscles. Motor unit potential morphology and recruitment patterns were normal.     Interpretation:  This is an abnormal study. There is electrophysiologic evidence of a moderate right-sided median neuropathy at the wrist, as can be seen in the clinical  context of carpal tunnel syndrome. Clinical correlation is recommended.     Kraig Prather MD  Department of Neurology      Nerve Conduction Studies  Motor Sites      Latency Neg. Amp Neg. Amp Diff Segment Distance Velocity Neg. Dur Neg Area Diff Temperature Comment   Site (ms) Norm (mV) Norm (%)  cm m/s Norm (ms) (%) ( C)    Right Median (APB) Motor   Wrist 4.9  < 4.4 8.3  > 5.0  Wrist-APB 8   5.0  31.4    Elbow 8.5 - 7.1  > 5.0 -14 Elbow-Wrist 19 53  > 48 5.3 -5 31.5    Right Median/Ulnar (Lumb-Dors Int II) Motor        Median (Lumb I)   Wrist 5.3 - 0.34 -  Wrist-Lumb I 10   7.5  31.2         Ulnar (Dors int II (pedis))   Wrist 3.1 - 1.31 -  Wrist-Dors int II (pedis) 10   4.2  31.4    Right Ulnar (ADM) Motor   Wrist 2.1  < 3.5 9.3  > 5.0  Wrist-ADM 8   5.0  31.5    Below Elbow 5.7 - 8.2 - -12 Below Elbow-Wrist 20 56  > 48 5.0 -8 31.5    Above Elbow 7.2 - 8.0 - -2 Above Elbow-Below Elbow 9 60  > 48 5.1 -2 31.5      Sensory Sites      Onset Lat Peak Lat Amp (O-P) Amp (P-P) Segment Distance Velocity Temperature Comment   Site ms (ms)  V Norm ( V)  cm m/s Norm ( C)    Right Median Sensory   Wrist-Dig II 4.2 5.0 5  > 10 5 Wrist-Dig II 14 33  > 48 31.4    Right Ulnar Sensory   Wrist-Dig V 1.98 2.6 20  > 8 85 Wrist-Dig V 12.5 63  > 48 30.5        Electromyography     Side Muscle Ins Act Fibs/PSW Fasc HF Amp Dur Poly Recrt Int Pat   Right Deltoid Nml None Nml 0 Nml Nml 0 Nml Nml   Right Biceps Nml None Nml 0 Nml Nml 0 Nml Nml   Right Triceps Nml None Nml 0 Nml Nml 0 Nml Nml   Right Pronator Teres Nml None Nml 0 Nml Nml 0 Nml Nml   Right FDI Nml None Nml 0 Nml Nml 0 Nml Nml         Waveforms / Images:     Motor           Sensory                   Again, thank you for allowing me to participate in the care of your patient.      Sincerely,    Kraig Prather MD

## 2025-04-28 NOTE — LETTER
[facility] ORTHOPEDIC CLINIC [address]  4TH FLOOR  [facility] [address]  [phone]          April 28, 2025    RE:  Katelynn Cronin                                                                                                                                                       [address] [place] [id number]            To whom it may concern:    Katelynn Cronin is under my professional care for Pain of right hand She  may return to work with the following: The employee is ABLE to return to work today.    When the patient returns to work, the following restrictions apply:  Lift, carry, push, and pull no more than:  5 lbs.Light duty-unable to lift more than 5 pounds and will need to wear her brace at all times.      Sincerely,        Yonis Alegre MD

## 2025-04-28 NOTE — PROGRESS NOTES
[facility]  Electrodiagnostic Laboratory                 Department of Neurology                                                                                                         Test Date:  2025    Patient: Katelynn Cronin : 1985 Physician: Kraig Prather MD   Sex: Female AGE: 39 year Ref Phys: Yonis Alegre MD   ID#: [id number]   Technician: Kristy Behling     History and Examination:  39 year old with about 2 months of intermittent numbness and paresthesias in her right hand, become more intense over the last 3 weeks. This study is being performed to investigate for radiculopathy vs focal neuropathy.     Techniques:  Motor and sensory conduction studies were done with surface recording electrodes.  EMG was done with a concentric needle electrode.     Results  Nerve conduction studies:  1. Right median-D2 sensory response shows severely slowed CV and moderately reduced amplitude.   2. Right ulnar-D5 sensory response is normal.   3. Right median-APB motor response shows mildly prolonged DL, normal amplitude and normal CV.   4. Right ulnar-ADM motor responses is normal.   5. Right median-lumbrical vs ulnar-interosseous latency difference is prolonged.    Needle EMG of selected proximal and distal right upper limb muscles was performed as tabulated below. No abnormal spontaneous activity was observed in the sampled muscles. Motor unit potential morphology and recruitment patterns were normal.     Interpretation:  This is an abnormal study. 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 Clinical correlation is recommended.     Kraig Prather MD  Department of Neurology      Nerve Conduction Studies  Motor Sites      Latency Neg. Amp Neg. Amp Diff Segment Distance Velocity Neg. Dur Neg Area Diff Temperature Comment   Site (ms) Norm (mV) Norm (%)  cm m/s Norm  (ms) (%) ( C)    Right Median (APB) Motor   Wrist 4.9  < 4.4 8.3  > 5.0  Wrist-APB 8   5.0  31.4    Elbow 8.5 - 7.1  > 5.0 -14 Elbow-Wrist 19 53  > 48 5.3 -5 31.5    Right Median/Ulnar (Lumb-Dors Int II) Motor        Median (Lumb I)   Wrist 5.3 - 0.34 -  Wrist-Lumb I 10   7.5  31.2         Ulnar (Dors int II (pedis))   Wrist 3.1 - 1.31 -  Wrist-Dors int II (pedis) 10   4.2  31.4    Right Ulnar (ADM) Motor   Wrist 2.1  < 3.5 9.3  > 5.0  Wrist-ADM 8   5.0  31.5    Below Elbow 5.7 - 8.2 - -12 Below Elbow-Wrist 20 56  > 48 5.0 -8 31.5    Above Elbow 7.2 - 8.0 - -2 Above Elbow-Below Elbow 9 60  > 48 5.1 -2 31.5      Sensory Sites      Onset Lat Peak Lat Amp (O-P) Amp (P-P) Segment Distance Velocity Temperature Comment   Site ms (ms)  V Norm ( V)  cm m/s Norm ( C)    Right Median Sensory   Wrist-Dig II 4.2 5.0 5  > 10 5 Wrist-Dig II 14 33  > 48 31.4    Right Ulnar Sensory   Wrist-Dig V 1.98 2.6 20  > 8 85 Wrist-Dig V 12.5 63  > 48 30.5        Electromyography     Side Muscle Ins Act Fibs/PSW Fasc HF Amp Dur Poly Recrt Int Pat   Right Deltoid Nml None Nml 0 Nml Nml 0 Nml Nml   Right Biceps Nml None Nml 0 Nml Nml 0 Nml Nml   Right Triceps Nml None Nml 0 Nml Nml 0 Nml Nml   Right Pronator Teres Nml None Nml 0 Nml Nml 0 Nml Nml   Right FDI Nml None Nml 0 Nml Nml 0 Nml Nml         Waveforms / Images:     Motor           Sensory

## 2025-04-28 NOTE — PROGRESS NOTES
Hand / Upper Extremity Injection/Arthrocentesis: R carpal tunnel    Date/Time: 2025 11:23 AM    Performed by: Yonis Alegre MD  Authorized by: Yonis Alegre MD    Indications:  Pain  Needle Size:  25 G  Guidance: landmark    Condition: carpal tunnel      Site:  R carpal tunnel  Medications:  4 mg dexAMETHasone 4 MG/ML; 2 mL lidocaine (PF) 1 %  Outcome:  Tolerated well, no immediate complications  Procedure discussed: discussed risks, benefits, and alternatives    Consent Given by:  Patient  Timeout: timeout called immediately prior to procedure    Prep: patient was prepped and draped in usual sterile fashion        [facility] ORTHOPEDIC [address] [id number] [phone]  Dept: [phone]  ______________________________________________________________________________    Patient: Katelynn Cronin   : 1985   MRN: [id number]   2025    INVASIVE PROCEDURE SAFETY CHECKLIST    Date: 2025   Procedure:Right wrist carpal tunnel steroid injection  Patient Name: Katelynn Cronin  MRN: [id number]  YOB: 1985    Action: Complete sections as appropriate. Any discrepancy results in a HARD COPY until resolved.     PRE PROCEDURE:  Patient ID verified with 2 identifiers (name and  or MRN): Yes  Procedure and site verified with patient/designee (when able): Yes  Accurate consent documentation in medical record: Yes  H&P (or appropriate assessment) documented in medical record: Yes  H&P must be up to 20 days prior to procedure and updates within 24 hours of procedure as applicable: Yes  Relevant diagnostic and radiology test results appropriately labeled and displayed as applicable: NA  Procedure site(s) marked with provider initials: NA    TIMEOUT:  Time-Out performed immediately prior to starting procedure, including verbal and active participation of all team members addressing the following:Yes  * Correct patient identify  *  Confirmed that the correct side and site are marked  * An accurate procedure consent form  * Agreement on the procedure to be done  * Correct patient position  * Relevant images and results are properly labeled and appropriately displayed  * The need to administer antibiotics or fluids for irrigation purposes during the procedure as applicable   * Safety precautions based on patient history or medication use    DURING PROCEDURE: Verification of correct person, site, and procedures any time the responsibility for care of the patient is transferred to another member of the care team.       The following medications were given:         Prior to injection, verified patient identity using patient's name and date of birth.  Due to injection administration, patient instructed to remain in clinic for 15 minutes  afterwards, and to report any adverse reaction to me immediately.    Tendon sheath injection was performed.   Medication Name: Dexamethasone Sodium Phosphate  4mg/mL NDC 34931-319-30  Drug Amount Wasted:  None.  Vial/Syringe: Single dose vial  Expiration Date:  05/01/26    Medication Name Lidocaine 1% NDC 63804-249-93    Scribed by HELDER WARREN, ATC for Dr. Alegre on April 28, 2025 at 10/1/28  based on the provider's statements to me.     HELDER WARREN, ATC

## 2025-05-05 ENCOUNTER — VIRTUAL VISIT (OUTPATIENT)
Dept: ORTHOPEDICS | Facility: CLINIC | Age: 40
End: 2025-05-05
Attending: STUDENT IN AN ORGANIZED HEALTH CARE EDUCATION/TRAINING PROGRAM
Payer: COMMERCIAL

## 2025-05-05 DIAGNOSIS — G56.01 CARPAL TUNNEL SYNDROME OF RIGHT WRIST: Primary | ICD-10-CM

## 2025-05-05 NOTE — NURSING NOTE
Reason For Visit:   Chief Complaint   Patient presents with    RECHECK     Follow-up Right carpal tunnel syndrome  EMG results from 4/28/25       Primary MD: Lokesh Logan  Ref. MD: Ale    Age: 39 year old    ?  No      There were no vitals taken for this visit.      Pain Assessment  Patient Currently in Pain: Yes  0-10 Pain Scale: 5 (With use)  Primary Pain Location: Wrist (Right wrist)  Pain Descriptors: Intermittent, Aching, Dull    Hand Dominance Evaluation  Hand Dominance: Right          QuickDASH Assessment      5/5/2025     8:55 AM   QuickDASH Main   1. Open a tight or new jar Mild difficulty   2. Do heavy household chores (e.g., wash walls, floors) Mild difficulty   3. Carry a shopping bag or briefcase Mild difficulty   4. Wash your back Unable to answer   5. Use a knife to cut food Moderate difficulty   6. Recreational activities in which you take some force or impact through your arm, shoulder or hand (e.g., golf, hammering, tennis, etc.) Moderate difficulty   7. During the past week, to what extent has your arm, shoulder or hand problem interfered with your normal social activities with family, friends, neighbours or groups Slightly   8. During the past week, were you limited in your work or other regular daily activities as a result of your arm, shoulder or hand problem Moderately limited   9. Arm, shoulder or hand pain Mild   10.Tingling (pins and needles) in your arm,shoulder or hand None   11. During the past week, how much difficulty have you had sleeping because of the pain in your arm, shoulder or hand No difficulty   Quickdash Ability Score 22.73          Current Outpatient Medications   Medication Sig Dispense Refill    alum & mag hydroxide-simethicone (MAALOX MAX) 400-400-40 MG/5ML SUSP suspension Take 15 mLs by mouth every 4 hours as needed for indigestion. 100 mL 0    cetirizine (ZYRTEC) 10 MG tablet [CETIRIZINE (ZYRTEC) 10 MG TABLET] Take 10 mg by mouth.      diazepam (VALIUM) 5 MG  tablet Take 1 tablet (5 mg) by mouth every 6 hours as needed for muscle spasms 5 tablet 0    gabapentin (NEURONTIN) 100 MG capsule [GABAPENTIN (NEURONTIN) 100 MG CAPSULE] Take 100 mg by mouth.      hydrOXYzine (VISTARIL) 50 MG capsule Take 1 capsule (50 mg) by mouth 3 times daily as needed for anxiety 20 capsule 0    ibuprofen (ADVIL/MOTRIN) 600 MG tablet Take 1 tablet (600 mg) by mouth every 6 hours as needed for moderate pain 16 tablet 0    meclizine (ANTIVERT) 25 MG tablet Take 1 tablet (25 mg) by mouth 3 times daily as needed for dizziness 20 tablet 0    methocarbamol (ROBAXIN) 750 MG tablet Take 1-2 tablets (750-1,500 mg) by mouth 4 times daily as needed for muscle spasms 30 tablet 0    naproxen (NAPROSYN) 500 MG tablet [NAPROXEN (NAPROSYN) 500 MG TABLET] Take 500 mg by mouth.      ondansetron (ZOFRAN ODT) 4 MG ODT tab Take 1 tablet (4 mg) by mouth every 6 hours as needed for nausea or vomiting 20 tablet 0    predniSONE (DELTASONE) 20 MG tablet Take two tablets (= 40mg) each day for 5 (five) days 10 tablet 0    QUEtiapine (SEROQUEL) 50 MG tablet [QUETIAPINE (SEROQUEL) 50 MG TABLET] Take 50 mg by mouth.      selenium sulfide 2.25 % Sham [SELENIUM SULFIDE 2.25 % SHAM] Apply 6 mL topically daily. 180 mL 0    venlafaxine (EFFEXOR-XR) 150 MG 24 hr capsule [VENLAFAXINE (EFFEXOR-XR) 150 MG 24 HR CAPSULE] TAKE 2 CAPSULES BY MOUTH EVERY DAY WITH A MEAL         Allergies   Allergen Reactions    Grass Extracts [Gramineae Pollens] Rash and Swelling     Swelling, itching, and no voice   Swelling, itching, and no voice       Latex Rash    Pollen Extracts [Pollen Extract] Rash     Swelling, itching, and no voice        HELDER WARREN, ATC

## 2025-05-05 NOTE — LETTER
5/5/2025      Katelynn Cronin  [address] [place] [address]      Dear Colleague,    Thank you for referring your patient, Katelynn Cronin, to the [facility] [place]. Please see a copy of my visit note below.    Katelynn is a 39 year old who is being evaluated via a billable video visit.      Video-Visit Details    Type of service:  Video Visit   Video Start Time: 9:21AM  Video End Time: 9:31 AM  Originating Location (pt. Location): Home    Distant Location (provider location):  On-site  Platform used for Video Visit: [facility]  Signed Electronically by: Yonis Alegre MD      Orthopaedic Surgery Hand and Upper Extremity Clinic Progress Note:  Date: May 5, 2025     Patient Name: Katelynn Cronin  MRN: [id number]    Consult requested by: Lashae Barajas    CHIEF COMPLAINT: Right hand pain, numbness, tingling    Dominant Hand: Right  Occupation: /    Interval 5/5/25: Patient had EMG/NCS last week and is here today to discuss results.  The patient reports some improvement after steroid injection but it is still bothersome.    HPI:  Ms. Katelynn Cronin is a 39 year old  female right hand dominant who presents with right hand numbness, tingling, and pain.  Ongoing for about 3 weeks.  No injury or trauma.  Symptoms mostly affecting the thumb, index, middle finger but also spreading to the ring finger.  Small finger less affected.  He also endorses muscle tightness at the volar forearm and anterior bicep.  Symptoms occur throughout the day and at night.  Worse at night.  She thinks that cleaning the glass in the mornings at work aggravates symptoms.  She is using a brace at night and throughout the day.  The patient recently did go to the emergency department 4/23/2025 due to worsening symptoms.  The patient was provided with oral prednisone which did help the symptoms a little bit.  Also using Tylenol, Aleve, ibuprofen.      VITALS:  There were no vitals filed for this  visit.      EXAM:  General: NAD, A&Ox3  HEENT: NC/AT  CV: RRR by peripheral pulse  Pulmonary: Non-labored breathing on RA  RUE:  Skin intact, no deformity, no atrophy  Diminished sensation to light touch in the median nerve distribution, intact radial and ulnar  2-point discrimination 6 mm median and ulnar  Tenderness at the carpal tunnel, positive Tinel's and Durkan's compression test  Negative Tinel's at the cubital tunnel  Tenderness over the course of the median nerve in the volar forearm, at the lacertus, and along the medial upper arm  Intact EPL, FPL, APB, intrinsics  WWP, cap refill less than 2 seconds       IMAGING:    X-rays right wrist and hand 4/23/2025 without significant bony abnormality    EMG/NCS 4/28/25  Results  Nerve conduction studies:  1. Right median-D2 sensory response shows severely slowed CV and moderately reduced amplitude.   2. Right ulnar-D5 sensory response is normal.   3. Right median-APB motor response shows mildly prolonged DL, normal amplitude and normal CV.   4. Right ulnar-ADM motor responses is normal.   5. Right median-lumbrical vs ulnar-interosseous latency difference is prolonged.     Needle EMG of selected proximal and distal right upper limb muscles was performed as tabulated below. No abnormal spontaneous activity was observed in the sampled muscles. Motor unit potential morphology and recruitment patterns were normal.      Interpretation:  This is an abnormal study. 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 Clinical correlation is recommended.       I have personally reviewed the above images and labs.         IMPRESSION AND RECOMMENDATIONS:  Ms. Katelynn Cronin is a 39 year old female right hand dominant with right carpal tunnel syndrome.    No evidence of compression proximally.  We discussed treatment options, including continued bracing, repeat steroid  injections, versus carpal tunnel release.  The patient states that she is starting school online in August and wishes that the symptoms will be completely resolved by then as typing and computer work are very aggravating.  We therefore discussed open and endoscopic release.  I think that she would be a good candidate for an endoscopic carpal tunnel release.    The indications for surgery were discussed with the patient. The benefits, risks, and alternatives of operative management were discussed in detail with the patient. The patient understands that the risks of surgery include, but are not limited to: infection, bleeding, injury to nearby structures (such as nerves, blood vessels, and tendons), temporary weakness in , need for additional surgery, pain, stiffness, scarring, need for rehabilitation, and anesthetic complications.  Patient expressed understanding and elected to proceed with surgery. All questions were answered to the patient's satisfaction.    To be scheduled after 6/9/2025, 6 weeks after last injection.  If she has improvement in symptoms prior to surgery, I have asked her to contact me.    Case request placed, local only      Yonis Alegre MD    Hand, Upper Extremity & Microvascular Surgery  Department of Orthopaedic Surgery  [facility]          Again, thank you for allowing me to participate in the care of your patient.        Sincerely,        Yonis Alegre MD    Electronically signed

## 2025-05-05 NOTE — PROGRESS NOTES
Katelynn is a 39 year old who is being evaluated via a billable video visit.      Video-Visit Details    Type of service:  Video Visit   Video Start Time: 9:21AM  Video End Time: 9:31 AM  Originating Location (pt. Location): Home    Distant Location (provider location):  On-site  Platform used for Video Visit: [employer]  Signed Electronically by: Yonis Alegre MD      Orthopaedic Surgery [facility] Clinic Progress Note:  Date: May 5, 2025     Patient Name: Katelynn Cronin  MRN: [id number]    Consult requested by: Lashae Barajas    CHIEF COMPLAINT: Right hand pain, numbness, tingling    Dominant Hand: Right  Occupation: /    Interval 5/5/25: Patient had EMG/NCS last week and is here today to discuss results.  The patient reports some improvement after steroid injection but it is still bothersome.    HPI:  Ms. Katelynn Cronin is a 39 year old  female right hand dominant who presents with right hand numbness, tingling, and pain.  Ongoing for about 3 weeks.  No injury or trauma.  Symptoms mostly affecting the thumb, index, middle finger but also spreading to the ring finger.  Small finger less affected.  He also endorses muscle tightness at the volar forearm and anterior bicep.  Symptoms occur throughout the day and at night.  Worse at night.  She thinks that cleaning the glass in the mornings at work aggravates symptoms.  She is using a brace at night and throughout the day.  The patient recently did go to the emergency department 4/23/2025 due to worsening symptoms.  The patient was provided with oral prednisone which did help the symptoms a little bit.  Also using Tylenol, Aleve, ibuprofen.      VITALS:  There were no vitals filed for this visit.      EXAM:  General: NAD, A&Ox3  HEENT: NC/AT  CV: RRR by peripheral pulse  Pulmonary: Non-labored breathing on RA  RUE:  Skin intact, no deformity, no atrophy  Diminished sensation to light touch in the median nerve distribution, intact radial and  ulnar  2-point discrimination 6 mm median and ulnar  Tenderness at the carpal tunnel, positive Tinel's and Durkan's compression test  Negative Tinel's at the cubital tunnel  Tenderness over the course of the median nerve in the volar forearm, at the lacertus, and along the medial upper arm  Intact EPL, FPL, APB, intrinsics  WWP, cap refill less than 2 seconds       IMAGING:    X-rays right wrist and hand 4/23/2025 without significant bony abnormality    EMG/NCS 4/28/25  Results  Nerve conduction studies:  1. Right median-D2 sensory response shows severely slowed CV and moderately reduced amplitude.   2. Right ulnar-D5 sensory response is normal.   3. Right median-APB motor response shows mildly prolonged DL, normal amplitude and normal CV.   4. Right ulnar-ADM motor responses is normal.   5. Right median-lumbrical vs ulnar-interosseous latency difference is prolonged.     Needle EMG of selected proximal and distal right upper limb muscles was performed as tabulated below. No abnormal spontaneous activity was observed in the sampled muscles. Motor unit potential morphology and recruitment patterns were normal.      Interpretation:  This is an abnormal study. 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 Clinical correlation is recommended.       I have personally reviewed the above images and labs.         IMPRESSION AND RECOMMENDATIONS:  Ms. Katelynn Cronin is a 39 year old female right hand dominant with right carpal tunnel syndrome.    No evidence of compression proximally.  We discussed treatment options, including continued bracing, repeat steroid injections, versus carpal tunnel release.  The patient states that she is starting school online in August and wishes that the symptoms will be completely resolved by then as typing and computer work are very aggravating.  We therefore discussed open and  endoscopic release.  I think that she would be a good candidate for an endoscopic carpal tunnel release.    The indications for surgery were discussed with the patient. The benefits, risks, and alternatives of operative management were discussed in detail with the patient. The patient understands that the risks of surgery include, but are not limited to: infection, bleeding, injury to nearby structures (such as nerves, blood vessels, and tendons), temporary weakness in , need for additional surgery, pain, stiffness, scarring, need for rehabilitation, and anesthetic complications.  Patient expressed understanding and elected to proceed with surgery. All questions were answered to the patient's satisfaction.    To be scheduled after 6/9/2025, 6 weeks after last injection.  If she has improvement in symptoms prior to surgery, I have asked her to contact me.    Case request placed, local only      Yonis Alegre MD    Hand, Upper Extremity & Microvascular Surgery  Department of Orthopaedic Surgery  [facility]

## 2025-05-07 ENCOUNTER — TELEPHONE (OUTPATIENT)
Dept: ORTHOPEDICS | Facility: CLINIC | Age: 40
End: 2025-05-07
Payer: COMMERCIAL

## 2025-05-08 PROBLEM — G56.01 CARPAL TUNNEL SYNDROME OF RIGHT WRIST: Status: ACTIVE | Noted: 2025-05-05

## 2025-05-09 ENCOUNTER — APPOINTMENT (OUTPATIENT)
Dept: RADIOLOGY | Facility: HOSPITAL | Age: 40
End: 2025-05-09
Attending: EMERGENCY MEDICINE
Payer: COMMERCIAL

## 2025-05-09 ENCOUNTER — HOSPITAL ENCOUNTER (EMERGENCY)
Facility: HOSPITAL | Age: 40
Discharge: HOME | End: 2025-05-09
Attending: EMERGENCY MEDICINE | Admitting: EMERGENCY MEDICINE
Payer: COMMERCIAL

## 2025-05-09 VITALS
RESPIRATION RATE: 18 BRPM | SYSTOLIC BLOOD PRESSURE: 119 MMHG | HEIGHT: 62 IN | DIASTOLIC BLOOD PRESSURE: 64 MMHG | TEMPERATURE: 98 F | BODY MASS INDEX: 38.64 KG/M2 | HEART RATE: 79 BPM | WEIGHT: 210 LBS | OXYGEN SATURATION: 99 %

## 2025-05-09 DIAGNOSIS — T18.9XXA FOREIGN BODY, SWALLOWED, INITIAL ENCOUNTER: ICD-10-CM

## 2025-05-09 DIAGNOSIS — S27.818A ESOPHAGEAL ABRASION, INITIAL ENCOUNTER: ICD-10-CM

## 2025-05-09 DIAGNOSIS — R09.A2 GLOBUS SENSATION: ICD-10-CM

## 2025-05-09 PROCEDURE — 250N000013 HC RX MED GY IP 250 OP 250 PS 637: Performed by: EMERGENCY MEDICINE

## 2025-05-09 PROCEDURE — 71046 X-RAY EXAM CHEST 2 VIEWS: CPT

## 2025-05-09 PROCEDURE — 70360 X-RAY EXAM OF NECK: CPT

## 2025-05-09 PROCEDURE — 250N000009 HC RX 250: Performed by: EMERGENCY MEDICINE

## 2025-05-09 PROCEDURE — 250N000011 HC RX IP 250 OP 636: Performed by: EMERGENCY MEDICINE

## 2025-05-09 PROCEDURE — 99283 EMERGENCY DEPT VISIT LOW MDM: CPT | Mod: 25 | Performed by: EMERGENCY MEDICINE

## 2025-05-09 RX ORDER — ONDANSETRON 4 MG/1
4 TABLET, ORALLY DISINTEGRATING ORAL ONCE
Status: COMPLETED | OUTPATIENT
Start: 2025-05-09 | End: 2025-05-09

## 2025-05-09 RX ORDER — LIDOCAINE HYDROCHLORIDE 20 MG/ML
5 SOLUTION OROPHARYNGEAL ONCE
Status: COMPLETED | OUTPATIENT
Start: 2025-05-09 | End: 2025-05-09

## 2025-05-09 RX ORDER — FAMOTIDINE 40 MG/5ML
20 POWDER, FOR SUSPENSION ORAL 2 TIMES DAILY
Qty: 35 ML | Refills: 0 | Status: SHIPPED | OUTPATIENT
Start: 2025-05-09 | End: 2025-05-16

## 2025-05-09 RX ORDER — SUCRALFATE ORAL 1 G/10ML
1 SUSPENSION ORAL ONCE
Status: COMPLETED | OUTPATIENT
Start: 2025-05-09 | End: 2025-05-09

## 2025-05-09 RX ORDER — MAGNESIUM HYDROXIDE/ALUMINUM HYDROXICE/SIMETHICONE 120; 1200; 1200 MG/30ML; MG/30ML; MG/30ML
15 SUSPENSION ORAL ONCE
Status: COMPLETED | OUTPATIENT
Start: 2025-05-09 | End: 2025-05-09

## 2025-05-09 RX ORDER — FAMOTIDINE 40 MG/5ML
20 POWDER, FOR SUSPENSION ORAL ONCE
Status: COMPLETED | OUTPATIENT
Start: 2025-05-09 | End: 2025-05-09

## 2025-05-09 RX ADMIN — SUCRALFATE 1 G: 1 SUSPENSION ORAL at 02:41

## 2025-05-09 RX ADMIN — LIDOCAINE HYDROCHLORIDE 5 ML: 20 SOLUTION ORAL at 01:09

## 2025-05-09 RX ADMIN — ALUMINUM HYDROXIDE, MAGNESIUM HYDROXIDE, AND SIMETHICONE 15 ML: 200; 200; 20 SUSPENSION ORAL at 01:09

## 2025-05-09 RX ADMIN — ONDANSETRON 4 MG: 4 TABLET, ORALLY DISINTEGRATING ORAL at 01:09

## 2025-05-09 RX ADMIN — FAMOTIDINE 20 MG: 40 POWDER, FOR SUSPENSION ORAL at 02:42

## 2025-05-09 ASSESSMENT — ACTIVITIES OF DAILY LIVING (ADL)
ADLS_ACUITY_SCORE: 41
ADLS_ACUITY_SCORE: 41

## 2025-05-09 ASSESSMENT — COLUMBIA-SUICIDE SEVERITY RATING SCALE - C-SSRS
2. HAVE YOU ACTUALLY HAD ANY THOUGHTS OF KILLING YOURSELF IN THE PAST MONTH?: NO
6. HAVE YOU EVER DONE ANYTHING, STARTED TO DO ANYTHING, OR PREPARED TO DO ANYTHING TO END YOUR LIFE?: NO
1. IN THE PAST MONTH, HAVE YOU WISHED YOU WERE DEAD OR WISHED YOU COULD GO TO SLEEP AND NOT WAKE UP?: NO

## 2025-05-09 NOTE — ED PROVIDER NOTES
NAME: Katelynn Cronin  AGE: 39 year old female  YOB: 1985  MRN: [id number]  EVALUATION DATE & TIME: 2025 12:32 AM    PCP: Lokesh Logan    ED PROVIDER: Adrian Chu M.D.      Chief Complaint   Patient presents with    Swallowed Foreign Body     FINAL IMPRESSION:  1. Foreign body, swallowed, initial encounter    2. Globus sensation    3. Esophageal abrasion, initial encounter        MEDICAL DECISION MAKIN:43 AM I met with the patient, obtained history, performed an initial exam, and discussed options and plan for diagnostics and treatment here in the ED.   2:17 AM I spoke with the patient about her XR results and discussed plan for discharge.    Patient was clinically assessed and consented to treatment. After assessment, medical decision making and workup were discussed with the patient. The patient was agreeable to plan for testing, workup, and treatment.  Pertinent Labs & Imaging studies reviewed. (See chart for details)       Medical Decision Making  I reviewed the EMR: Outpatient Record: ER visit from  when patient had similar complaints of swallowed foreign body  I independently interpreted the x-rays and note no acute foreign bodies or pneumomediastinum. See radiology report for final interpretation.  Discharge. I prescribed additional prescription strength medication(s) as charted. See documentation for any additional details.    MIPS (CTPE, Dental pain, Pat, Sinusitis, Asthma/COPD, Head Trauma): Not Applicable    SEPSIS: None    Katelynn Cronin is a 39 year old female who presents with swallowed foreign body.   Differential diagnosis includes but not limited to swallowed foreign body, esophageal perforation, globus sensation, esophagitis.  Patient's 39-year-old female who reports eating some ice cream and felt there was some plastic from the fast  that the ice cream/candy mixing was made with.  She noticed some red bits of plastic when she threw up  some of the aspirin.  Patient also having pain in the back of throat that feels like something is stuck there.  On examination I do not see anything in the upper oropharynx and can see fairly far back with even the epiglottis popping up into view.  No bleeding, no foreign bodies.  Patient sent for x-rays to evaluate this and no obvious large foreign bodies and with small bits of plastic these likely would pass on their own.  Unlikely that they would get caught in her throat and if they were big enough to be caught in her throat I would suspect to see some deformity of the esophagus or evidence on x-ray.  Patient's esophagus OTHERWISE unremarkable on soft tissue neck as well as no obvious foreign body seen in stomach.  Given that most with flecks of specks of plastic were small likely these would pass on their own.  Patient with negative workup and likely globus sensation.  Patient given GI cocktail with viscous lidocaine and some relief but reports that this sensation did come back.  I will try to start patient on Pepcid and Carafate to see if this helps reduce acid irritation along with coating of the esophagus.  After discussion with patient I feel patient could go home and has no signs of any airway obstruction, no esophageal injury and no foreign bodies.    0 minutes of critical care time    MEDICATIONS GIVEN IN THE EMERGENCY:  Medications   ondansetron (ZOFRAN ODT) ODT tab 4 mg (4 mg Oral $Given 5/9/25 0109)   alum & mag hydroxide-simethicone (MAALOX) suspension 15 mL (15 mLs Oral $Given 5/9/25 0109)   lidocaine (viscous) (XYLOCAINE) 2 % solution 5 mL (5 mLs Mouth/Throat $Given 5/9/25 0109)   sucralfate (CARAFATE) suspension 1 g (1 g Oral $Given 5/9/25 0241)   famotidine (PEPCID) suspension 20 mg (20 mg Oral $Given 5/9/25 0242)       NEW PRESCRIPTIONS STARTED AT TODAY'S ER VISIT:  Discharge Medication List as of 5/9/2025  2:25 AM        START taking these medications    Details   famotidine (PEPCID) 40 MG/5ML  "suspension Take 2.5 mLs (20 mg) by mouth 2 times daily for 7 days., Disp-35 mL, R-0, Local Print                =================================================================    HPI    Patient information was obtained from: The patient    Use of : N/A        Katelynn Cronin is a 39 year old female with a past medical history of vocal cord dysfunction, who presents with concerns of swallowing foreign body.    The patient reports that she was eating ice cream and notes seeing \"red plastic\" pieces in her ice cream. She indicates that she subsequently vomited and reports having red in her vomit. She is complaining that she feels like there is a foreign body in her throat.  Patient reports feeling as if something stuck across her upper throat and is not having difficulty swallowing but it hurts to swallow.  She also reports no difficulty breathing.  She does report drinking water or any other solids irritate the back of the throat where she swallows and feels it is likely from plastic and whether might be a piece there.  Patient does not recall eating any large bits of plastic but rather the little flecks that she saw in the vomitus.  No abdominal pain, no chest pain or abdominal bloating.      REVIEW OF SYSTEMS   Review of Systems - Refer to HPI, otherwise negative    PAST MEDICAL HISTORY:  Past Medical History:   Diagnosis Date    Acute midline low back pain without sciatica 2/10/2019    Formatting of this note might be different from the original. Slipped and fell directly onto her back on 2/4/2019. Was seen at [facility] ER on 2/4/2019 and lumbar spine x-rays showed an age indeterminate 20% compression of L2 (but patient was not found to have tenderness to palpation over L2 on clinical exam in the ER), and lumbar spine, pelvis and left hip x-rays were otherwise unremarkable    Alcohol abuse 8/31/2018    Formatting of this note might be different from the original. [facility] ER visit on " 6/29/2018 for panic attack with alcohol intoxication (blood alcohol level 0.125).  Hospitalization at [facility] 8/13-8/20/2018 for alcohol intoxication (MILTON 0.16) and acetaminophen overdose. Patient was voluntarily admitted to the inpatient psychiatric unit for observation and treatment following medi    Allergic rhinitis 5/7/2014    Formatting of this note might be different from the original. Her symptoms are worst during the Spring (?) Skin Prick Testing was performed on: 6/13/2014  Sensitive to: English Plantain & Molds    Amblyopia 7/8/2010    Formatting of this note might be different from the original. Right eye.    Anxiety 2/29/2012    Formatting of this note might be different from the original. Zoloft started 2/29/12.  Switched to Effexor XR 1/15/2014.    MACARIO III (cervical intraepithelial neoplasia grade III) with severe dysplasia 10/29/2013    Formatting of this note might be different from the original. 09/04/2012 ASCUSHPV+ 10/02/2012 [place] MACARIO I 09/10/2013 [facility]/ASCUS/HPV+ 10/10/2013 [place] MACARIO II 10/29/2013 [facility] MACARIO II-III, Negative margins 01/29/2015 NIL/HPV negative 03/06/2015 NIL/HPV negative 05/17/2016 NIL/HPV negative 02/28/2019 NIL/HPV negative  ASCCP recommends:  History of CIN2 - CIN3:  Pap and HPV every 3 years for 20 years.   Pl    Dysthymic disorder 1/25/2013    Eczema 2/19/2015    Hypercholesterolemia 2/19/2015    Formatting of this note might be different from the original. Mildly elevated total and LDL cholesterol levels, along with a low HDL level, noted 2/19/2015.    Lateral epicondylitis of right elbow 8/28/2014    Moderate episode of recurrent major depressive disorder (H) 3/4/2019    Formatting of this note might be different from the original. Zoloft started 2/29/12. Previously was on medication from 5699-2899, and briefly again in 2009 for postpartum depression. Effexor XR started on 1/15/2014. She is also following with Psychology for counseling therapy.    Overweight 6/29/2009     PPH (postpartum hemorrhage) 3/13/2009    Vitamin D deficiency 6/13/2014    Formatting of this note might be different from the original. Treatment started 6/13/2014 with Vitamin D 5,000 units daily per Allergy specialist.    Vocal cord dysfunction 6/15/2014    Formatting of this note might be different from the original. Diagnosed by Allergy specialist 6/13/2014, and treatment with Speech Therapy offered if the patient wishes to pursue this.       PAST SURGICAL HISTORY:  Past Surgical History:   Procedure Laterality Date    COLPOSCOPY CERVIX, LOOP ELECTRODE BIOPSY, COMBINED      wisdom teeth Bilateral        CURRENT MEDICATIONS:    No current facility-administered medications for this encounter.    Current Outpatient Medications:     famotidine (PEPCID) 40 MG/5ML suspension, Take 2.5 mLs (20 mg) by mouth 2 times daily for 7 days., Disp: 35 mL, Rfl: 0    alum & mag hydroxide-simethicone (MAALOX MAX) 400-400-40 MG/5ML SUSP suspension, Take 15 mLs by mouth every 4 hours as needed for indigestion., Disp: 100 mL, Rfl: 0    cetirizine (ZYRTEC) 10 MG tablet, [CETIRIZINE (ZYRTEC) 10 MG TABLET] Take 10 mg by mouth., Disp: , Rfl:     diazepam (VALIUM) 5 MG tablet, Take 1 tablet (5 mg) by mouth every 6 hours as needed for muscle spasms, Disp: 5 tablet, Rfl: 0    gabapentin (NEURONTIN) 100 MG capsule, [GABAPENTIN (NEURONTIN) 100 MG CAPSULE] Take 100 mg by mouth., Disp: , Rfl:     hydrOXYzine (VISTARIL) 50 MG capsule, Take 1 capsule (50 mg) by mouth 3 times daily as needed for anxiety, Disp: 20 capsule, Rfl: 0    ibuprofen (ADVIL/MOTRIN) 600 MG tablet, Take 1 tablet (600 mg) by mouth every 6 hours as needed for moderate pain, Disp: 16 tablet, Rfl: 0    meclizine (ANTIVERT) 25 MG tablet, Take 1 tablet (25 mg) by mouth 3 times daily as needed for dizziness, Disp: 20 tablet, Rfl: 0    methocarbamol (ROBAXIN) 750 MG tablet, Take 1-2 tablets (750-1,500 mg) by mouth 4 times daily as needed for muscle spasms, Disp: 30 tablet, Rfl:  0    naproxen (NAPROSYN) 500 MG tablet, [NAPROXEN (NAPROSYN) 500 MG TABLET] Take 500 mg by mouth., Disp: , Rfl:     ondansetron (ZOFRAN ODT) 4 MG ODT tab, Take 1 tablet (4 mg) by mouth every 6 hours as needed for nausea or vomiting, Disp: 20 tablet, Rfl: 0    predniSONE (DELTASONE) 20 MG tablet, Take two tablets (= 40mg) each day for 5 (five) days, Disp: 10 tablet, Rfl: 0    QUEtiapine (SEROQUEL) 50 MG tablet, [QUETIAPINE (SEROQUEL) 50 MG TABLET] Take 50 mg by mouth., Disp: , Rfl:     selenium sulfide 2.25 % Sham, [SELENIUM SULFIDE 2.25 % SHAM] Apply 6 mL topically daily., Disp: 180 mL, Rfl: 0    venlafaxine (EFFEXOR-XR) 150 MG 24 hr capsule, [VENLAFAXINE (EFFEXOR-XR) 150 MG 24 HR CAPSULE] TAKE 2 CAPSULES BY MOUTH EVERY DAY WITH A MEAL, Disp: , Rfl:     ALLERGIES:  Allergies   Allergen Reactions    Grass Extracts [Gramineae Pollens] Rash and Swelling     Swelling, itching, and no voice   Swelling, itching, and no voice       Latex Rash    Pollen Extracts [Pollen Extract] Rash     Swelling, itching, and no voice        FAMILY HISTORY:  No family history on file.    SOCIAL HISTORY:   Social History     Socioeconomic History    Marital status: Single   Tobacco Use    Smoking status: Never    Smokeless tobacco: Never   Substance and Sexual Activity    Alcohol use: Yes    Drug use: Not Currently    Sexual activity: Not Currently     Social Drivers of Health     Financial Resource Strain: Low Risk  (8/5/2024)    Received from [facility]    Financial Resource Strain     Difficulty of Paying Living Expenses: 3   Food Insecurity: No Food Insecurity (8/5/2024)    Received from [facility]    Food Insecurity     Do you worry your food will run out before you are able to buy more?: 1   Transportation Needs: No Transportation Needs (8/5/2024)    Received from [facility]    Transportation Needs     Does lack of transportation keep  "you from medical appointments?: 1     Does lack of transportation keep you from work, meetings or getting things that you need?: 1   Social Connections: Socially Integrated (8/5/2024)    Received from [employer] [facility]    Social Connections     Do you often feel lonely or isolated from those around you?: 0   Housing Stability: Low Risk  (8/5/2024)    Received from [employer] [facility]    Housing Stability     What is your housing situation today?: 1       PHYSICAL EXAM:    Vitals: /64   Pulse 79   Temp 98  F (36.7  C) (Temporal)   Resp 18   Ht 1.575 m (5' 2\")   Wt 95.3 kg (210 lb)   SpO2 99%   BMI 38.41 kg/m     Physical Exam  Vitals and nursing note reviewed.   Constitutional:       General: She is not in acute distress.     Appearance: Normal appearance. She is obese. She is not ill-appearing, toxic-appearing or diaphoretic.   HENT:      Head: Normocephalic.      Mouth/Throat:      Mouth: Mucous membranes are moist.      Pharynx: Oropharynx is clear. No oropharyngeal exudate or posterior oropharyngeal erythema.      Comments: No erythema, no blood, no posterior pharyngeal injection or acute findings.  On examination I am unable to see both arches and both tonsils as well as top of the epiglottis when she opens broadly.  Cardiovascular:      Rate and Rhythm: Normal rate and regular rhythm.      Heart sounds: Normal heart sounds.   Pulmonary:      Effort: Pulmonary effort is normal. No respiratory distress.      Breath sounds: Normal breath sounds. No stridor.   Abdominal:      Tenderness: There is no abdominal tenderness.   Musculoskeletal:      Cervical back: Normal range of motion and neck supple. No tenderness.   Lymphadenopathy:      Cervical: No cervical adenopathy.   Skin:     General: Skin is warm and dry.      Coloration: Skin is not pale.   Neurological:      Mental Status: She is alert.   Psychiatric:         Behavior: Behavior normal.      "      LAB:  All pertinent labs reviewed and interpreted.  Labs Ordered and Resulted from Time of ED Arrival to Time of ED Departure - No data to display    RADIOLOGY:  XR Chest 2 Views   Final Result   IMPRESSION: Negative chest.      Neck soft tissue XR   Final Result   IMPRESSION: No prevertebral edema. Normal appearance of the epiglottis and larynx. No airway compromise. There is no discrete radiopaque foreign body or abnormal increased density visualized.          EKG:   N/A    PROCEDURES:   Procedures       I, Krissy Yeh, am serving as a scribe to document services personally performed by Dr. Adrian Chu  based on my observation and the provider's statements to me. IAdrian MD attest that Krissy Yeh is acting in a scribe capacity, has observed my performance of the services and has documented them in accordance with my direction.      Adrian Chu M.D.  Emergency Medicine  [facility] Emergency Department      Adrian Chu MD  05/09/25 0457

## 2025-05-09 NOTE — ED TRIAGE NOTES
"Pt arrives to triage for a foreign body in her throat. She was eating ice cream and reports seeing \"red plastic\" pieces in her ice cream. She reports she then vomited and reports having red in her vomit.         "

## 2025-05-09 NOTE — Clinical Note
Katelynn Cronin was seen and treated in our emergency department on 5/9/2025.  She may return to work on 05/10/2025.       If you have any questions or concerns, please don't hesitate to call.      Adrian Chu MD

## 2025-05-14 ENCOUNTER — TELEPHONE (OUTPATIENT)
Dept: ORTHOPEDICS | Facility: CLINIC | Age: 40
End: 2025-05-14
Payer: COMMERCIAL

## 2025-05-14 NOTE — TELEPHONE ENCOUNTER
Left voice mail for patient to call RN for pre-op teaching for Rt endoscopic carpal tunnel, local only, Dr Alegre on 6-12-25. Jessenia Velarde, RN

## 2025-06-05 PROBLEM — M26.623 BILATERAL TEMPOROMANDIBULAR JOINT PAIN: Status: ACTIVE | Noted: 2022-02-07

## 2025-06-05 PROBLEM — M26.633 ARTICULAR DISC DISORDER OF BOTH TEMPOROMANDIBULAR JOINTS: Status: ACTIVE | Noted: 2022-02-07

## 2025-06-05 PROBLEM — M26.52 LIMITED OPENING OF MANDIBLE: Status: ACTIVE | Noted: 2022-04-06

## 2025-06-05 PROBLEM — F39 EPISODIC MOOD DISORDER: Status: ACTIVE | Noted: 2018-08-15

## 2025-06-05 PROBLEM — T74.21XA SEXUAL ASSAULT OF ADULT: Status: ACTIVE | Noted: 2018-03-12

## 2025-06-05 PROBLEM — M79.18 MYOFASCIAL PAIN: Status: ACTIVE | Noted: 2022-02-07

## 2025-06-05 NOTE — TELEPHONE ENCOUNTER
Teaching Flowsheet     Visit Type: Telephone    Time Start: 0801  Time End: 0811  Total Time Spent: 10 min.    Surgeon: Dr Yonis Alegre  Location of Surgery (known or anticipated): [facility]   Type of Anesthesia: Local  Worker's Compensation Procedure: No    Pertinent Medical History: Hx alcohol abuse, Depression/Anxiety  Were medical conditions reviewed and appropriate for location? Yes  BMI: Obesity Grade II BMI 35-39.9 (38.41)    Relevant Diagnosis: Carpal tunnel syndrome on right  Teaching Topic: Right endoscopic carpal tunnel release    Person(s) involved in teaching:   Patient  : No.   Verified Patient's Phone Number: YES: [phone]    Caregiver//  Name: N/A- local only anesthetic       Motivation Level:  Asks Questions: Yes  Eager to Learn: Yes  Cooperative: Yes  Receptive (willing/able to accept information): Yes  Any cultural factors/Adventist beliefs that may influence understanding or compliance? No     Patient demonstrates understanding of the following:  Reason for the appointment, diagnosis and treatment plan: Yes  Knowledge of proper use of medications and conditions for which they are ordered (with special attention to potential side effects or drug interactions): Yes  Which situations necessitate calling provider and whom to contact: Yes     Teaching Concerns Addressed:   Proper use and care of medical equip, care aids, etc.: Yes  Nutritional needs and diet plan: Yes  Pain management techniques: Yes  Wound Care: Yes  How and/when to access community resources: Yes  Need for pre-op with in 30 days: N/A     Does patient have difficulty getting a ride to appointments (post-ops, PT/OT): No  Patient's plan after discharge: home with family or spouse     Instructional Materials Used/Given: [employer] reviewed, after-hours number provided, patient verbalized understanding, had no immediate questions.    - Important Contact Info/Phone Numbers: emphasizing clinic number  [phone] and after hours number [phone]  - Map/location of surgery and follow-up appointments  - Showering instructions  - [employer]     -Next step: awaiting call from pre-surgery nurses for arrival time.    Jessenia Velarde RN

## (undated) RX ORDER — DEXAMETHASONE SODIUM PHOSPHATE 4 MG/ML
INJECTION, SOLUTION INTRA-ARTICULAR; INTRALESIONAL; INTRAMUSCULAR; INTRAVENOUS; SOFT TISSUE
Status: DISPENSED
Start: 2025-04-28

## (undated) RX ORDER — LIDOCAINE HYDROCHLORIDE 10 MG/ML
INJECTION, SOLUTION EPIDURAL; INFILTRATION; INTRACAUDAL; PERINEURAL
Status: DISPENSED
Start: 2025-04-28